# Patient Record
Sex: MALE | Race: WHITE
[De-identification: names, ages, dates, MRNs, and addresses within clinical notes are randomized per-mention and may not be internally consistent; named-entity substitution may affect disease eponyms.]

---

## 2019-09-03 ENCOUNTER — HOSPITAL ENCOUNTER (EMERGENCY)
Dept: HOSPITAL 97 - ER | Age: 34
Discharge: HOME | End: 2019-09-03
Payer: COMMERCIAL

## 2019-09-03 DIAGNOSIS — H01.002: Primary | ICD-10-CM

## 2019-09-03 PROCEDURE — 96372 THER/PROPH/DIAG INJ SC/IM: CPT

## 2019-09-03 PROCEDURE — 99284 EMERGENCY DEPT VISIT MOD MDM: CPT

## 2019-09-03 PROCEDURE — 70450 CT HEAD/BRAIN W/O DYE: CPT

## 2019-09-03 NOTE — RAD REPORT
EXAM DESCRIPTION:  CT - Head Brain Wo Cont - 9/3/2019 9:15 am

 

CLINICAL HISTORY:  Visual disturbance

 

COMPARISON:  None.

 

TECHNIQUE:  Computed axial tomography of the head was obtained. IV contrast was not requested.

 

All CT scans are performed using dose optimization technique as appropriate and may include automated
 exposure control or mA/KV adjustment according to patient size.

 

FINDINGS:  An intracranial  bleed is not seen .

 

The ventricles are normal in caliber.

 

No extra-axial fluid collection is noted.

 

Fluid within the sinuses/ mastoids is not seen.

 

IMPRESSION:  No acute intracranial abnormality is seen. If patient's symptoms persist  MRI of the bra
in would be recommended.

## 2019-09-03 NOTE — ER
Nurse's Notes                                                                                     

 [facility]                                                                 

Name: Nhan Santos                                                                             

Age: 34 yrs                                                                                       

Sex: Male                                                                                         

: 1985                                                                                   

MRN: [id number]                                                                                   

Arrival Date: 2019                                                                          

Time: 08:53                                                                                       

Account#: [id number]                                                                            

Bed 5                                                                                             

Private MD:                                                                                       

Diagnosis: Blepharitis                                                                            

                                                                                                  

Presentation:                                                                                     

                                                                                             

08:58 Presenting complaint: Blurred vision in right eye and under eye "heaviness" x 1 week.   hb  

      Denies pain/injury. Transition of care: patient was not received from another setting       

      of care. Onset of symptoms was 2019. Risk Assessment: Do you want to hurt          

      yourself or someone else? Patient reports no desire to harm self or others. Initial         

      Sepsis Screen: Does the patient meet any 2 criteria? No. Patient's initial sepsis           

      screen is negative. Does the patient have a suspected source of infection? No.              

      Patient's initial sepsis screen is negative. Care prior to arrival: None.                   

08:58 Method Of Arrival: Ambulatory                                                           hb  

08:58 Acuity: FLAVIO 3                                                                           hb  

                                                                                                  

Historical:                                                                                       

- Allergies:                                                                                      

09:00 No Known Allergies;                                                                     hb  

- Home Meds:                                                                                      

09:00 None [Active];                                                                          hb  

- PMHx:                                                                                           

09:00 None;                                                                                   hb  

- PSHx:                                                                                           

09:00 back;                                                                                   hb  

                                                                                                  

- Immunization history:: Adult Immunizations up to date.                                          

- Social history:: Smoking status: Patient/guardian denies using tobacco.                         

- Ebola Screening: : No symptoms or risks identified at this time.                                

                                                                                                  

                                                                                                  

Screenin:41 Abuse screen: Denies threats or abuse. Denies injuries from another. Nutritional        jl7 

      screening: No deficits noted. Tuberculosis screening: No symptoms or risk factors           

      identified. Fall Risk None identified.                                                      

                                                                                                  

Assessment:                                                                                       

09:41 General: Appears in no apparent distress. uncomfortable, Behavior is calm, cooperative, jl7 

      appropriate for age. Pain: Denies pain. Neuro: Level of Consciousness is awake, alert,      

      obeys commands, Oriented to person, place, time, situation. Cardiovascular: Patient's       

      skin is warm and dry. Respiratory: Airway is patent Respiratory effort is even,             

      unlabored, Respiratory pattern is regular, symmetrical. EENT: Sclera/Cornea are             

      reddened in right eye. Derm: Skin is pink, warm \T\ dry.                                    

10:36 Reassessment: Patient appears in no apparent distress at this time. No changes from     jl7 

      previously documented assessment. Patient and/or family updated on plan of care and         

      expected duration. Pain level reassessed. Patient is alert, oriented x 3, equal             

      unlabored respirations, skin warm/dry/pink.                                                 

                                                                                                  

Vital Signs:                                                                                      

09:00  / 93; Pulse 58; Resp 16; Temp 98.1; Pulse Ox 100% on R/A; Weight 92.99 kg;       hb  

      Height 6 ft. (182.88 cm); Pain 0/10;                                                        

10:08  / 91; Pulse 52; Resp 18; Temp 97.7(O); Pulse Ox 100% on R/A;                     kj1 

09:00 Body Mass Index 27.80 (92.99 kg, 182.88 cm)                                             hb  

                                                                                                  

Visual Acuity:                                                                                    

10:00 Left Eye Visual acuity 20/25, Normal; Right Eye Visual acuity 20/13, Contracted; Both   kj1 

      Eyes Visual acuity 20/30; Without Lenses;                                                   

                                                                                                  

ED Course:                                                                                        

08:53 Patient arrived in ED.                                                                  as  

09:00 Triage completed.                                                                       hb  

09:00 Arm band placed on.                                                                     hb  

09:01 Alvaro Hardy NP is PHCP.                                                           pm1 

09:01 Octavio Patel MD is Attending Physician.                                             pm1 

09:14 CT completed. Patient tolerated procedure well. Patient moved to radiology via          sw  

      wheelchair. Patient moved back from CT.                                                     

09:22 CT Head Brain wo Cont In Process Unspecified.                                           EDMS

09:35 Derick Nunez, RN is Primary Nurse.                                                      jl7 

09:41 Patient has correct armband on for positive identification. Bed in low position. Call   [facility] 

      light in reach. Side rails up X 1.                                                          

10:00 Assist provider with eye exam of right eye. using fluorescein stain, Performed by       leroy Hardy NP Patient tolerated well. Patient did not have IV access during this       

      emergency room visit.                                                                       

                                                                                                  

Administered Medications:                                                                         

10:00 Drug: Tetracaine Drops 0.5 % 1 drops {Note: Administered by Alvaro Hardy NP.}       jl7 

      Route: Ophthalmic; Site: right eye;                                                         

10:35 Drug: TORadol 60 mg Route: IM; Site: right ventrogluteal;                               jl7 

10:46 Follow up: Response: No adverse reaction                                                jl7 

                                                                                                  

                                                                                                  

Outcome:                                                                                          

10:23 Discharge ordered by MD.                                                                pm1 

10:46 Discharged to home ambulatory.                                                          jl7 

10:46 Condition: stable                                                                           

10:46 Discharge instructions given to patient, Instructed on discharge instructions, follow       

      up and referral plans. medication usage, Demonstrated understanding of instructions,        

      follow-up care, medications, Prescriptions given X 1.                                       

10:47 Patient left the ED.                                                                    jl7 

                                                                                                  

Signatures:                                                                                       

Dispatcher [employer]                           EDMS                                                 

Snehal Crocker Shannon sw Marinas, Patrick, OTILIO                    NP   pm1                                                  

Airam Mckee, GEETA                     RN                                                      

Derick Nunez RN RN   jl7                                                  

Sandy Ramires                              kj1                                                  

                                                                                                  

Corrections: (The following items were deleted from the chart)                                    

10:02 10:00 Right Eye Without Lenses, 20/25, Contracted, Left Eye Without Lenses, 20/13,      kj1 

      Normal, Both Eyes Without Lenses, 20/30 kj1                                                 

10:36 10:00 Tetracaine Drops 0.5 % 1 drops Ophthalmic in both eyes jl7                        jl7 

                                                                                                  

**************************************************************************************************

## 2019-09-03 NOTE — EDPHYS
Physician Documentation                                                                           

 [facility]                                                                 

Name: Nhan Santos                                                                             

Age: 34 yrs                                                                                       

Sex: Male                                                                                         

: 1985                                                                                   

MRN: [id number]                                                                                   

Arrival Date: 2019                                                                          

Time: 08:53                                                                                       

Account#: [id number]                                                                            

Bed 5                                                                                             

Private MD:                                                                                       

ED Physician Octavio Patel                                                                      

HPI:                                                                                              

                                                                                             

09:07 This 34 yrs old  Male presents to ER via Ambulatory with complaints of Blurred pm1 

      Vision, Numbness - Eye.                                                                     

09:07 The patient is experiencing blurred vision, to the right eye, caused by an unknown      pm1 

      mechanism. Onset: The symptoms/episode began/occurred 1 week(s) ago. Duration: the          

      symptoms are continuous. Aggravated by nothing. Alleviated by closing right eye.            

      Associated signs and symptoms: Pertinent negatives: chills, ear ache, fever, headache,      

      runny nose. Patient does not utilize any form of vision correction. Severity of             

      symptoms: in the emergency department the symptoms are unchanged. The patient has not       

      experienced similar symptoms in the past. The patient has not recently seen a               

      physician. Patient with the sensation of numbness to right lower eyelid and                 

      discoloration. No pain. right eye blurry vision. Denies trauma.                             

                                                                                                  

Historical:                                                                                       

- Allergies:                                                                                      

09:00 No Known Allergies;                                                                     hb  

- Home Meds:                                                                                      

09:00 None [Active];                                                                          hb  

- PMHx:                                                                                           

09:00 None;                                                                                   hb  

- PSHx:                                                                                           

09:00 back;                                                                                   hb  

                                                                                                  

- Immunization history:: Adult Immunizations up to date.                                          

- Social history:: Smoking status: Patient/guardian denies using tobacco.                         

- Ebola Screening: : No symptoms or risks identified at this time.                                

                                                                                                  

                                                                                                  

ROS:                                                                                              

09:07 Constitutional: Negative for fever, chills, and weight loss.                            pm1 

09:07 ENT: Negative for injury, pain, and discharge, Neck: Negative for injury, pain, and         

      swelling, Cardiovascular: Negative for chest pain, palpitations, and edema,                 

      Respiratory: Negative for shortness of breath, cough, wheezing, and pleuritic chest         

      pain, Abdomen/GI: Negative for abdominal pain, nausea, vomiting, diarrhea, and              

      constipation, Back: Negative for injury and pain, MS/Extremity: Negative for injury and     

      deformity, Skin: Negative for injury, rash, and discoloration, Neuro: Negative for          

      headache, weakness, numbness, tingling, and seizure.                                        

09:07 Eyes: Positive for blurry vision, swelling, of the right lower eyelid, Negative for         

      discharge, foreign body sensation.                                                          

                                                                                                  

Exam:                                                                                             

10:00 Constitutional:  This is a well developed, well nourished patient who is awake, alert,  pm1 

      and in no acute distress. Head/Face:  Normocephalic, atraumatic.                            

10:00 ENT:  Nares patent. No nasal discharge, no septal abnormalities noted.  Tympanic            

      membranes are normal and external auditory canals are clear.  Oropharynx with no            

      redness, swelling, or masses, exudates, or evidence of obstruction, uvula midline.          

      Mucous membranes moist. Neck:  Trachea midline, no thyromegaly or masses palpated, and      

      no cervical lymphadenopathy.  Supple, full range of motion without nuchal rigidity, or      

      vertebral point tenderness.  No Meningismus. Chest/axilla:  Normal chest wall               

      appearance and motion.  Nontender with no deformity.  No lesions are appreciated.           

      Cardiovascular:  Regular rate and rhythm with a normal S1 and S2.  No gallops, murmurs,     

      or rubs.  Normal PMI, no JVD.  No pulse deficits. Respiratory:  Lungs have equal breath     

      sounds bilaterally, clear to auscultation and percussion.  No rales, rhonchi or wheezes     

      noted.  No increased work of breathing, no retractions or nasal flaring. Skin:  Warm,       

      dry with normal turgor.  Normal color with no rashes, no lesions, and no evidence of        

      cellulitis. MS/ Extremity:  Pulses equal, no cyanosis.  Neurovascular intact.  Full,        

      normal range of motion.                                                                     

10:00 Eyes: Extraocular movements: no acute changes, Conjunctiva: normal, no chemosis, no         

      exudate, no injection, no subconjunctival hemorrhage no abnormal tearing, Corneas: no       

      acute changes, a fluorescein strip employed to appreciate the findings, Sclera: no          

      acute changes, Lids and lashes: edema, right lower eyelid, Examination of the other eye     

      reveals no obvious gross abnormality.                                                       

10:00 Neuro: Orientation: is normal, Motor: is normal, moves all fours.                           

                                                                                                  

Vital Signs:                                                                                      

09:00  / 93; Pulse 58; Resp 16; Temp 98.1; Pulse Ox 100% on R/A; Weight 92.99 kg;       hb  

      Height 6 ft. (182.88 cm); Pain 0/10;                                                        

10:08  / 91; Pulse 52; Resp 18; Temp 97.7(O); Pulse Ox 100% on R/A;                     kj1 

09:00 Body Mass Index 27.80 (92.99 kg, 182.88 cm)                                             hb  

                                                                                                  

Visual Acuity:                                                                                    

10:00 Left Eye Visual acuity 20/25, Normal; Right Eye Visual acuity 20/13, Contracted; Both   kj1 

      Eyes Visual acuity 20/30; Without Lenses;                                                   

                                                                                                  

MDM:                                                                                              

09:02 Patient medically screened.                                                             pm1 

10:21 Data reviewed: vital signs. Data interpreted: Pulse oximetry: on room air is 100 %.     pm1 

      Interpretation: normal. Counseling: I had a detailed discussion with the patient and/or     

      guardian regarding: the historical points, exam findings, and any diagnostic results        

      supporting the discharge/admit diagnosis, radiology results, the need for outpatient        

      follow up, an opthalmologist, to return to the emergency department if symptoms worsen      

      or persist or if there are any questions or concerns that arise at home.                    

                                                                                                  

                                                                                             

09:07 Order name: CT Head Brain wo Cont; Complete Time: 09:51                                 pm1 

                                                                                             

09:07 Order name: Visual Acuity; Complete Time: 10:13                                         pm1 

                                                                                             

09:07 Order name: Eye Tray; Complete Time: 09:42                                              pm1 

                                                                                             

09:07 Order name: Fluoresene Opth strip; Complete Time: 09:42                                 pm1 

                                                                                                  

Administered Medications:                                                                         

10:00 Drug: Tetracaine Drops 0.5 % 1 drops {Note: Administered by Alvaro Hardy NP.}       jl7 

      Route: Ophthalmic; Site: right eye;                                                         

10:35 Drug: TORadol 60 mg Route: IM; Site: right ventrogluteal;                               jl7 

10:46 Follow up: Response: No adverse reaction                                                jl7 

                                                                                                  

                                                                                                  

Disposition:                                                                                      

15:40 Co-signature as Attending Physician, Octavio Patel MD I agree with the assessment and  oliver 

      plan of care.                                                                               

                                                                                                  

Disposition:                                                                                      

19 10:23 Discharged to Home. Impression: Blepharitis.                                       

- Condition is Stable.                                                                            

- Discharge Instructions: Blepharitis.                                                            

- Prescriptions for Erythromycin 5 mg/gram (0.5 %) Ophthalmic Ointment - apply 1                  

  centimeter by OPHTHALMIC route every 8 hours for 7 days; 1 tube.                                

- Medication Reconciliation Form, Thank You Letter, Antibiotic Education, Prescription            

  Opioid Use form.                                                                                

- Follow up: Emergency Department; When: As needed; Reason: Worsening of condition.               

  Follow up: Private Physician; When: 2 - 3 days; Reason: Recheck today's complaints,             

  Continuance of care, Re-evaluation by your physician.                                           

- Problem is new.                                                                                 

- Symptoms have improved.                                                                         

                                                                                                  

                                                                                                  

                                                                                                  

Signatures:                                                                                       

Dispatcher [employer]                           EDMS                                                 

Octavio Patel MD MD cha Marinas, Patrick, NP                    NP   pm1                                                  

Airam Mckee, RN                     RN                                                      

Derick Nunez RN                        RN   jl7                                                  

                                                                                                  

Corrections: (The following items were deleted from the chart)                                    

10:47 10:23 2019 10:23 Discharged to Home. Impression: Blepharitis. Condition is        jl7 

      Stable. Forms are Medication Reconciliation Form, Thank You Letter, Antibiotic              

      Education, Prescription Opioid Use. Follow up: Emergency Department; When: As needed;       

      Reason: Worsening of condition. Follow up: Private Physician; When: 2 - 3 days; Reason:     

      Recheck today's complaints, Continuance of care, Re-evaluation by your physician.           

      Problem is new. Symptoms have improved. pm1                                                 

                                                                                                  

**************************************************************************************************

## 2019-12-02 ENCOUNTER — HOSPITAL ENCOUNTER (OUTPATIENT)
Dept: HOSPITAL 97 - ER | Age: 34
Setting detail: OBSERVATION
LOS: 1 days | Discharge: HOME | End: 2019-12-03
Attending: HOSPITALIST | Admitting: HOSPITALIST
Payer: COMMERCIAL

## 2019-12-02 VITALS — BODY MASS INDEX: 28.6 KG/M2

## 2019-12-02 DIAGNOSIS — N17.9: ICD-10-CM

## 2019-12-02 DIAGNOSIS — R11.2: ICD-10-CM

## 2019-12-02 DIAGNOSIS — K35.80: Primary | ICD-10-CM

## 2019-12-02 DIAGNOSIS — Z23: ICD-10-CM

## 2019-12-02 LAB
ALBUMIN SERPL BCP-MCNC: 3.8 G/DL (ref 3.4–5)
ALBUMIN SERPL BCP-MCNC: 4.5 G/DL (ref 3.4–5)
ALP SERPL-CCNC: 83 U/L (ref 45–117)
ALP SERPL-CCNC: 97 U/L (ref 45–117)
ALT SERPL W P-5'-P-CCNC: 40 U/L (ref 12–78)
ALT SERPL W P-5'-P-CCNC: 45 U/L (ref 12–78)
AST SERPL W P-5'-P-CCNC: 24 U/L (ref 15–37)
AST SERPL W P-5'-P-CCNC: 31 U/L (ref 15–37)
BLD SMEAR INTERP: (no result)
BUN BLD-MCNC: 14 MG/DL (ref 7–18)
BUN BLD-MCNC: 15 MG/DL (ref 7–18)
GLUCOSE SERPLBLD-MCNC: 306 MG/DL (ref 74–106)
GLUCOSE SERPLBLD-MCNC: 311 MG/DL (ref 74–106)
HCT VFR BLD CALC: 43.5 % (ref 39.6–49)
HCT VFR BLD CALC: 48.2 % (ref 39.6–49)
LIPASE SERPL-CCNC: 532 U/L (ref 73–393)
LYMPHOCYTES # SPEC AUTO: 1 K/UL (ref 0.7–4.9)
LYMPHOCYTES # SPEC AUTO: 1.8 K/UL (ref 0.7–4.9)
MORPHOLOGY BLD-IMP: (no result)
PMV BLD: 10.6 FL (ref 7.6–11.3)
PMV BLD: 9.8 FL (ref 7.6–11.3)
POTASSIUM SERPL-SCNC: 4 MMOL/L (ref 3.5–5.1)
POTASSIUM SERPL-SCNC: 4 MMOL/L (ref 3.5–5.1)
RBC # BLD: 4.9 M/UL (ref 4.33–5.43)
RBC # BLD: 5.5 M/UL (ref 4.33–5.43)
UA COMPLETE W REFLEX CULTURE PNL UR: (no result)

## 2019-12-02 PROCEDURE — 71045 X-RAY EXAM CHEST 1 VIEW: CPT

## 2019-12-02 PROCEDURE — 80053 COMPREHEN METABOLIC PANEL: CPT

## 2019-12-02 PROCEDURE — 80076 HEPATIC FUNCTION PANEL: CPT

## 2019-12-02 PROCEDURE — 99285 EMERGENCY DEPT VISIT HI MDM: CPT

## 2019-12-02 PROCEDURE — 88304 TISSUE EXAM BY PATHOLOGIST: CPT

## 2019-12-02 PROCEDURE — 83690 ASSAY OF LIPASE: CPT

## 2019-12-02 PROCEDURE — 85025 COMPLETE CBC W/AUTO DIFF WBC: CPT

## 2019-12-02 PROCEDURE — 0DTJ4ZZ RESECTION OF APPENDIX, PERCUTANEOUS ENDOSCOPIC APPROACH: ICD-10-PCS

## 2019-12-02 PROCEDURE — 36415 COLL VENOUS BLD VENIPUNCTURE: CPT

## 2019-12-02 PROCEDURE — 44970 LAPAROSCOPY APPENDECTOMY: CPT

## 2019-12-02 PROCEDURE — 96375 TX/PRO/DX INJ NEW DRUG ADDON: CPT

## 2019-12-02 PROCEDURE — 74177 CT ABD & PELVIS W/CONTRAST: CPT

## 2019-12-02 PROCEDURE — 80048 BASIC METABOLIC PNL TOTAL CA: CPT

## 2019-12-02 PROCEDURE — 90471 IMMUNIZATION ADMIN: CPT

## 2019-12-02 PROCEDURE — 81001 URINALYSIS AUTO W/SCOPE: CPT

## 2019-12-02 PROCEDURE — 96374 THER/PROPH/DIAG INJ IV PUSH: CPT

## 2019-12-02 RX ADMIN — MORPHINE SULFATE PRN MG: 2 INJECTION, SOLUTION INTRAMUSCULAR; INTRAVENOUS at 12:29

## 2019-12-02 RX ADMIN — SODIUM CHLORIDE SCH MLS: 0.9 INJECTION, SOLUTION INTRAVENOUS at 06:06

## 2019-12-02 RX ADMIN — SODIUM CHLORIDE, SODIUM LACTATE, POTASSIUM CHLORIDE, AND CALCIUM CHLORIDE ONE MLS: .6; .31; .03; .02 INJECTION, SOLUTION INTRAVENOUS at 14:40

## 2019-12-02 RX ADMIN — CIPROFLOXACIN SCH MLS: 2 INJECTION, SOLUTION INTRAVENOUS at 21:28

## 2019-12-02 RX ADMIN — SERTRALINE HYDROCHLORIDE SCH: 100 TABLET ORAL at 09:00

## 2019-12-02 RX ADMIN — SODIUM CHLORIDE SCH MLS: 0.9 INJECTION, SOLUTION INTRAVENOUS at 12:30

## 2019-12-02 RX ADMIN — CIPROFLOXACIN SCH MLS: 2 INJECTION, SOLUTION INTRAVENOUS at 12:29

## 2019-12-02 RX ADMIN — Medication SCH: at 09:00

## 2019-12-02 RX ADMIN — SODIUM CHLORIDE, SODIUM LACTATE, POTASSIUM CHLORIDE, AND CALCIUM CHLORIDE ONE MLS: .6; .31; .03; .02 INJECTION, SOLUTION INTRAVENOUS at 14:34

## 2019-12-02 RX ADMIN — METRONIDAZOLE SCH MLS: 500 INJECTION, SOLUTION INTRAVENOUS at 16:55

## 2019-12-02 RX ADMIN — Medication SCH ML: at 21:29

## 2019-12-02 RX ADMIN — MORPHINE SULFATE PRN MG: 2 INJECTION, SOLUTION INTRAMUSCULAR; INTRAVENOUS at 17:38

## 2019-12-02 RX ADMIN — BUPIVACAINE HYDROCHLORIDE AND EPINEPHRINE ONE ML: 5; 5 INJECTION, SOLUTION EPIDURAL; INTRACAUDAL; PERINEURAL at 13:52

## 2019-12-02 RX ADMIN — BUPIVACAINE HYDROCHLORIDE AND EPINEPHRINE ONE ML: 5; 5 INJECTION, SOLUTION EPIDURAL; INTRACAUDAL; PERINEURAL at 14:16

## 2019-12-02 NOTE — P.HP
Certification for Inpatient


Patient admitted to: Observation


With expected LOS: <2 Midnights


Patient will require the following post-hospital care: None


Practitioner: I am a practitioner with admitting privileges, knowledge of 

patient current condition, hospital course, and medical plan of care.


Services: Services provided to patient in accordance with Admission 

requirements found in Title 42 Section 412.3 of the Code of Federal Regulations





Patient History


Date of Service: 12/02/19


Reason for admission: Intractable nausea and vomiting/abdominal pain


History of Present Illness: 





Patient is a 35yo who was admitted to the hospital with intractable nausea and 

vomiting. Patient also had abdominal pain. Patient came to the emergency room 

for further evaluation. In the ER, pts work-up revealed elevated lipase with no 

further abnormality.   Patient will be admitted to the hospital for further 

evaluation.  Most likely patient has viral gastroenteritis.    We will get a CT 

scan for further evaluation. We will continue to of evaluate.


Allergies





No Known Allergies Allergy (Verified 12/02/19 05:02)


 





Home Medications: 








ARIPiprazole [Abilify*] 10 mg PO DAILY 12/02/19 


Sertraline [Zoloft*] 100 mg PO DAILY 12/02/19 








- Past Medical/Surgical History


Has patient received pneumonia vaccine in the past: No


Diabetic: No


-: pancreatitis


-: Cervical fusion C4-C7


-: Cholecystectomy





- Family History


  ** Father


Medical History: Heart disease





  ** Mother


Medical History: Hypertension, Cancer


Notes: breast





- Social History


Smoking Status: Never smoker


Alcohol use: No


CD- Drugs: No


Caffeine use: Yes


Place of Residence: Home





Review of Systems


10-point ROS is otherwise unremarkable





Physical Examination





- Vital Signs


Temperature: 98.4 F


Blood Pressure: 108/61


Pulse: 71


Respirations: 17


Pulse Ox (%): 97





- Physical Exam


General: Alert, In no apparent distress, Oriented x3


HEENT: Atraumatic, PERRLA, Mucous membr. moist/pink, EOMI, Sclerae nonicteric


Neck: Supple, 2+ carotid pulse no bruit, No LAD, Without JVD or thyroid 

abnormality


Respiratory: Clear to auscultation bilaterally, Normal air movement


Cardiovascular: Regular rate/rhythm, Normal S1 S2


Gastrointestinal: Normal bowel sounds, Soft and benign, Non-distended, No 

tenderness


Musculoskeletal: No clubbing, No swelling, No tenderness


Integumentary: No rashes


Neurological: Normal gait, Normal speech, Normal strength at 5/5 x4 extr, 

Normal tone, Normal affect


Lymphatics: No axilla or inguinal lymphadenopathy





- Studies


Laboratory Data (last 24 hrs)





12/02/19 01:40: Creatinine 1.43 H


12/02/19 01:40: WBC 15.2 H, Hgb 17.3, Hct 48.2, Plt Count 159


12/02/19 01:40: Sodium 135 L, Potassium 4.0, BUN 15, Creatinine 1.46 H, Glucose 

311 H, Total Bilirubin 0.7, AST 31, ALT 45, Alkaline Phosphatase 97, Lipase 532 

H








Assessment & Plan





- Problems (Diagnosis)


(1) Abdominal pain


Current Visit: Yes   Status: Acute   





(2) Intractable nausea and vomiting


Current Visit: Yes   Status: Acute   





- Plan





  Plan: 


1. IV fluids and IV antibiotics


2. Stool studies


3. GI consultation as an outpatient if symptoms worsen


4. Pain control


5. CT scan for further evaluation


6. GI and DVT prophylaxis 


Discharge Plan: Home


Plan to discharge in: Greater than 2 days





- Advance Directives


Does patient have a Living Will: No


Does patient have a Durable POA for Healthcare: No





- Code Status/Comfort Care


Code Status Assessed: Yes


Code Status: Full Code


Critical Care: No


Time Spent Managing PTS Care (In Minutes): 45

## 2019-12-02 NOTE — RAD REPORT
EXAM DESCRIPTION:  CT - Abdomen   Pelvis W Contrast - 12/2/2019 8:41 am

 

CLINICAL HISTORY:  epigastric pain/pancreatitis vs. eneteritis

 

COMPARISON:  None.

 

TECHNIQUE:  Biphasic, helical CT imaging of the abdomen and pelvis was performed following 100 ml non
-ionic IV contrast. Oral contrast was given.

 

All CT scans are performed using dose optimization technique as appropriate and may include automated
 exposure control or mA/KV adjustment according to patient size.

 

FINDINGS:  No suspicious findings in the lung bases.

 

Liver shows borderline to mild fatty infiltration with no focal liver lesion identified. No splenomeg
neeta or focal splenic finding. Small accessory splenic nodules are present. Gallbladder is not identif
ied. No cholecystectomy clips seen. There is no dilatation of the biliary tree.

 

No solid or cystic mass of the pancreas. No peripancreatic inflammatory stranding.

 

Symmetric renal function is seen with no hydronephrosis or suspicious renal mass. No pyelonephritis o
r acute parenchymal process. No bladder abnormalities. No adrenal abnormalities.

 

No gastric or duodenal wall thickening or edema identified. No stranding in the adjacent fat. Moderat
e stool volume throughout the colon. Fluid filled, nondilated distal small bowel loops are seen. Sigm
oid colon is tortuous and redundant.

 

Patient has an elongated appendix. No appendicolith seen. The distal appendix is 8- 9 mm in diameter.
 No periappendiceal inflammatory stranding.

 

  No free air, free fluid or pneumatosis.  No mass or bulky lymphadenopathy. Patient has a very small
 umbilical hernia.

 

No suspicious bony findings.

 

 

IMPRESSION:  No pancreatitis findings.  Stomach and duodenum show no acute findings.

 

Patient has any elongated appendix with the tip 8-9 mm in size. No appendicolith or periappendiceal i
nflammatory stranding.

 

Appendicitis is unlikely given the history of epigastric pain. Correlation can be made with laborator
y and exam findings. CT re-evaluation can be performed if the symptoms localize to the right lower qu
adrant.

## 2019-12-02 NOTE — CON
Date of Consultation:  12/02/2019



Brief History Of Present Illness:  Patient is a 34-year-old  male with a history of intracta
ble nausea, vomiting, and epigastric abdominal pain with radiation to the right lower quadrant.  He c
susana to the emergency room with the above-stated complaints.  Pain got progressively worse over the co
urse of the day and worse with movement.  It is radiating through his back as well on the back, flank
 area on the right.  He was admitted with possible enteritis type picture.



Past Medical History:  Significant for pancreatitis.



Past Surgical History:  Cervical fusion, cholecystectomy.



Home Medications:  Include Abilify and Zoloft for anxiety, depression.



Allergies:  NO KNOWN DRUG ALLERGIES.



Family History:  Reviewed, noncontributory.



Social History:  He denies smoking, alcohol, or recreational drug use.  He was a 
 by [employer].



Physical Examination:

Vital Signs:  At time of my examination, his BMI is 20.7, blood pressure 103/60, pulse 74, respirator
y rate 18, temperature 98.5. 

General:  He is awake, alert, and oriented. 

Psychiatric:  Appropriate and conversive. 

HEENT:  Normocephalic.  Sclerae icteric.  Mucous membranes are moist. 

Oropharynx:  Clear. 

Neck:  Supple.  No JVD.  

Chest:  Normal expansion and excursion. 

Cardiovascular:  Rate and rhythm. 

Pulmonary:  Clear to auscultation bilaterally. 

Abdomen:  Soft with positive epigastric and right upper quadrant tenderness to palpation.  He has pos
itive focal peritonitis at [place] point.  The remainder of his abdominal exam is consistent with a 
right lower quadrant abdominal pain with minimal tenderness with remainder of examination only to omkar
p palpation. 

Extremities:  No clubbing, cyanosis, edema. 

Skin:  Warm and dry.



Laboratory Data:  Reveals a white blood count 13.8, hemoglobin 15.1, hematocrit of 42.5, platelet cou
nt is 129, neutrophils 86%.  His sodium 136, potassium 4.0, chloride 104, carbon dioxide 28, BUN 14, 
creatinine 1.2, glucose is 306, total bilirubin 0.7, direct bilirubin 0.1, AST 24, ALT 40, alkaline p
hosphatase 83, lipase is 216.  His UA showed 3+ blood, 2+ glucose, 1+ ketones.  He had imaging perfor
med which included a CT scan of the abdomen and pelvis, officially read as no pancreatitis findings. 
 Stomach and duodenum show no acute findings.  Patient has an elongated appendix with the tip 8-9 mm 
in size.  No appendicolith or periappendiceal fat stranding or inflammatory stranding.  Appendicitis 
is unlikely given the history of epigastric pain.  Correlation needed with laboratory exam and findin
gs.  CT re-evaluation of symptoms localized to the right lower quadrant.



Assessment And Plan:  This is a 34-year-old male with localizing symptoms to right lower quadrant and
 findings of an equivocal possible early appendicitis.

1.IV fluid hydration.

2.Antibiotic coverage.

3.I have explained risks, benefits, and alternatives of diagnostic laparoscopy and appendectomy incl
uding but not limited to bleeding, infection, damage to surrounding tissue need for further operation
 and procedures.  Patient agrees to proceed as indicated.





DEMETRIS/YUE

DD:  12/02/2019 13:29:27Voice ID:  [id number]

DT:  12/02/2019 17:17:55Report ID:  [id number]

## 2019-12-02 NOTE — RAD REPORT
EXAM DESCRIPTION:  RAD - Chest Single View - 12/2/2019 7:10 am

 

CLINICAL HISTORY:  Pneumonia

 

COMPARISON:  None.

 

TECHNIQUE:  AP portable chest image was obtained 0655 hours .

 

FINDINGS:  Lungs are clear. Heart and vasculature are normal. No measurable pleural effusion and no p
neumothorax. No acute bony abnormality seen. No acute aortic findings suspected.

 

IMPRESSION:  No acute cardiopulmonary process.

## 2019-12-02 NOTE — P.OP
Preoperative diagnosis: Acute Appendicitis


Postoperative diagnosis: Acute Appendicitis


Primary procedure: Diagnostic Laparoscopy


Secondary procedure: Laparoscopic Appendectomy


Anesthesia: GETA + Local


Estimated blood loss: < 5 cc


Specimen: Vermiform Appendix


Findings: Early appendicitis


Complications: None


Transferred to: Recovery Room


Condition: Good

## 2019-12-02 NOTE — EDPHYS
Physician Documentation                                                                           

 [facility]                                                                 

Name: Nhan Santos                                                                             

Age: 34 yrs                                                                                       

Sex: Male                                                                                         

: 1985                                                                                   

MRN: [id number]                                                                                   

Arrival Date: 2019                                                                          

Time: 01:19                                                                                       

Account#: [id number]                                                                            

Bed 2                                                                                             

Private MD:                                                                                       

ED Physician Solomon Mitchell                                                                     

HPI:                                                                                              

                                                                                             

01:43 This 34 yrs old  Male presents to ER via Ambulatory with complaints of         tw4 

      Abdominal Pain, Nausea.                                                                     

01:43 The patient presents to the emergency department with nausea, that is moderate,         tw4 

      vomiting. Onset: The symptoms/episode began/occurred today. Possible causes: unknown.       

01:43 Onset: The symptoms/episode began/occurred 1 hour(s) ago. The symptoms are aggravated   tw4 

      by nothing. The symptoms are alleviated by nothing. Associated signs and symptoms: The      

      patient has no apparent associated signs or symptoms. Severity of symptoms: At their        

      worst the symptoms were moderate in the emergency department the symptoms are               

      unchanged. The patient has not experienced similar symptoms in the past.                    

                                                                                                  

Historical:                                                                                       

- Allergies:                                                                                      

01:40 No Known Allergies;                                                                     rr5 

- PMHx:                                                                                           

01:40 Pancreatitis;                                                                           rr5 

- PSHx:                                                                                           

01:40 2 cervical fusion; Cholecystectomy;                                                     rr5 

                                                                                                  

- Immunization history:: Adult Immunizations up to date.                                          

- Social history:: Smoking status: Patient/guardian denies using tobacco, Patient uses            

  alcohol, occasionally. Patient/guardian denies using street drugs.                              

- Ebola Screening: : Patient negative for fever greater than or equal to 101.5 degrees            

  Fahrenheit, and additional compatible Ebola Virus Disease symptoms Patient denies               

  exposure to infectious person Patient denies travel to an Ebola-affected area in the            

  21 days before illness onset.                                                                   

                                                                                                  

                                                                                                  

ROS:                                                                                              

01:43 Constitutional: Negative for fever, chills, and weight loss, Eyes: Negative for injury, tw4 

      pain, redness, and discharge, Cardiovascular: Negative for chest pain, palpitations,        

      and edema, Respiratory: Negative for shortness of breath, cough, wheezing, and              

      pleuritic chest pain, Back: Negative for injury and pain, MS/Extremity: Negative for        

      injury and deformity, Skin: Negative for injury, rash, and discoloration.                   

01:43 Abdomen/GI: Positive for abdominal pain, Negative for nausea and vomiting, nausea,          

      vomiting, and diarrhea, nausea, vomiting, abdominal cramps, abdominal distension,           

      anorexia, dysphagia, hematemesis, black/tarry stool, rectal pain, rectal bleeding,          

      bowel incontinence.                                                                         

                                                                                                  

Exam:                                                                                             

01:43 Constitutional:  This is a well developed, well nourished patient who is awake, alert,  tw4 

      and in no acute distress. Head/Face:  Normocephalic, atraumatic. Chest/axilla:  Normal      

      chest wall appearance and motion.  Nontender with no deformity.  No lesions are             

      appreciated. Cardiovascular:  Regular rate and rhythm with a normal S1 and S2.  No          

      gallops, murmurs, or rubs.  Normal PMI, no JVD.  No pulse deficits. Respiratory:  Lungs     

      have equal breath sounds bilaterally, clear to auscultation and percussion.  No rales,      

      rhonchi or wheezes noted.  No increased work of breathing, no retractions or nasal          

      flaring. Back:  No spinal tenderness.  No costovertebral tenderness.  Full range of         

      motion. Skin:  Warm, dry with normal turgor.  Normal color with no rashes, no lesions,      

      and no evidence of cellulitis. MS/ Extremity:  Pulses equal, no cyanosis.                   

      Neurovascular intact.  Full, normal range of motion. Neuro:  Awake and alert, GCS 15,       

      oriented to person, place, time, and situation.  Cranial nerves II-XII grossly intact.      

      Motor strength 5/5 in all extremities.  Sensory grossly intact.  Cerebellar exam            

      normal.  Normal gait.                                                                       

01:43 Abdomen/GI: Inspection: abdomen appears normal, Bowel sounds: diminished, Palpation:        

      moderate abdominal tenderness, in the epigastric area.                                      

                                                                                                  

Vital Signs:                                                                                      

01:38  / 89; Pulse 73; Resp 20; Temp 98.3; Pulse Ox 100% ; Weight 95.25 kg; Height 6    rr5 

      ft. 0 in. (182.88 cm); Pain 7/10;                                                           

02:30  / 84; Pulse 76; Resp 18; Pulse Ox 95% on R/A;                                    ea  

03:35  / 74; Pulse 70; Resp 17; Temp 98; Pulse Ox 99% ; Pain 5/10;                      rr5 

04:15  / 76; Pulse 70; Resp 18; Pulse Ox 99% on R/A;                                    ea  

01:38 Body Mass Index 28.48 (95.25 kg, 182.88 cm)                                             rr5 

                                                                                                  

MDM:                                                                                              

01:37 Patient medically screened.                                                             tw4 

02:35 Differential diagnosis: Nonspecific abd pain, gastritis, cholecystitis, pancreatitis,   tw4 

      appendicitis, diverticulitis. Data reviewed: vital signs, nurses notes. Data reviewed:      

      lab test result(s), CBC, white blood cell count, hemoglobin, hematocrit, platelets,         

      electrolytes, sodium, potassium, chloride, serum bicarbonate, BUN, creatinine, serum        

      glucose, hepatic panel. Data interpreted: Pulse oximetry: Interpretation: normal.           

      Counseling: I had a detailed discussion with the patient and/or guardian regarding: the     

      historical points, exam findings, and any diagnostic results supporting the                 

      discharge/admit diagnosis, lab results. Medication response: Phenergan partially            

      relieved the patient's nausea. Response to treatment: the patient's symptoms have           

      mildly improved after treatment, and as a result, I will admit patient. Physician           

      consultation: Sarthak Galicia MD regarding admission, to the medical/surgical unit.           

      patient's condition, and will see patient in inpatient room.                                

                                                                                                  

                                                                                             

01:37 Order name: Basic Metabolic Panel; Complete Time: 02:28                                 tw4 

                                                                                             

02:28 Interpretation: ; GFR 55; CRE 1.46; GLUC 311.                                     tw4 

                                                                                             

01:37 Order name: CBC with Diff; Complete Time: 02:28                                         tw4 

                                                                                             

02:29 Interpretation: Normal except: WBC 15.2; RBC 5.50; EHSAN% 82.6; LYM% 11.9; NEUT A 12.6.   tw4 

                                                                                             

01:37 Order name: Creatinine for Radiology; Complete Time: 02:28                              tw4 

                                                                                             

02:29 Interpretation: Normal except: CRE 1.43; GFR 56.                                        tw4 

                                                                                             

01:37 Order name: Hepatic Function; Complete Time: 02:28                                      tw4 

                                                                                             

02:29 Interpretation: Normal except: TP 8.6; GLOB 4.1.                                        tw4 

                                                                                             

01:37 Order name: Lipase; Complete Time: 02:28                                                tw4 

                                                                                             

02:29 Interpretation: Normal except: .                                                 tw4 

                                                                                             

03:32 Order name: CBC with Automated Diff                                                     EDMS

                                                                                             

03:32 Order name: Comprehensive Metabolic Panel                                               EDMS

                                                                                             

03:32 Order name: Comprehensive Metabolic Panel                                               EDMS

                                                                                             

03:35 Order name: CBC with Automated Diff                                                     EDMS

                                                                                             

03:35 Order name: Urinalysis W/Microscopic                                                    EDMS

                                                                                             

03:35 Order name: Chest Single View                                                           EDMS

                                                                                             

03:35 Order name: Lipase                                                                      EDMS

                                                                                             

01:37 Order name: IV Saline Lock; Complete Time: 01:38                                        tw4 

                                                                                             

01:37 Order name: Labs collected and sent; Complete Time: 01:38                               tw4 

                                                                                             

03:32 Order name: CONS Pharmacy Consult                                                       EDMS

                                                                                             

03:32 Order name: Regular                                                                     EDMS

                                                                                                  

Administered Medications:                                                                         

01:50 Drug: morphine 4 mg {Note: RASS 1.} Route: IVP; Site: right antecubital;                ea  

02:59 Follow up: Response: No adverse reaction; Pain is decreased; RASS: Alert and Calm (0)   ea  

01:50 Drug: Zofran 4 mg Route: IVP; Site: right antecubital;                                  ea  

02:59 Follow up: Response: No adverse reaction                                                ea  

02:14 Drug: Phenergan 25 mg Route: IVP; Site: right antecubital;                              ea  

02:59 Follow up: Response: No adverse reaction; Marked relief of symptoms                     ea  

                                                                                                  

                                                                                                  

Disposition:                                                                                      

19 02:35 Hospitalization ordered by Sarthak Galicia for Inpatient Admission. Preliminary     

  diagnosis are Cyclical vomiting, intractable, Acute pancreatitis,                               

  unspecified, Acute kidney failure.                                                              

- Bed requested for Telemetry/MedSurg (Inpatient).                                                

- Status is Inpatient Admission.                                                              rr5 

- Condition is Fair.                                                                              

- Problem is new.                                                                                 

- Symptoms are unchanged.                                                                         

UTI on Admission? No                                                                              

                                                                                                  

                                                                                                  

                                                                                                  

Signatures:                                                                                       

Dispatcher [employer]                           [facility]                                                 

Marisa James RN RN ea Aguilar, Jose RN                       GEET[employer]   ja1                                                  

Solomon Mitchell MD MD   tw4                                                  

Reynaldo Tafoya RN                      RN   rr5                                                  

                                                                                                  

Corrections: (The following items were deleted from the chart)                                    

03:41 02:35 Hospitalization Ordered by Sarthak Galicia MD for Inpatient Admission. Preliminary  ja1 

      diagnosis is Cyclical vomiting, intractable; Acute pancreatitis, unspecified; Acute         

      kidney failure. Bed requested for Telemetry/MedSurg (Inpatient). Status is Inpatient        

      Admission. Condition is Fair. Problem is new. Symptoms are unchanged. UTI on Admission?     

      No. tw4                                                                                     

04:24 03:41 2019 02:35 Hospitalization Ordered by Sarthak Galicia MD for Inpatient        rr5 

      Admission. Preliminary diagnosis is Cyclical vomiting, intractable; Acute pancreatitis,     

      unspecified; Acute kidney failure. Bed requested for Telemetry/MedSurg (Inpatient).         

      Status is Inpatient Admission. Condition is Fair. Problem is new. Symptoms are              

      unchanged. UTI on Admission? No. ja1                                                        

                                                                                                  

**************************************************************************************************

## 2019-12-02 NOTE — ER
Nurse's Notes                                                                                     

 [facility]                                                                 

Name: Nhan Santos                                                                             

Age: 34 yrs                                                                                       

Sex: Male                                                                                         

: 1985                                                                                   

MRN: [id number]                                                                                   

Arrival Date: 2019                                                                          

Time: 01:19                                                                                       

Account#: [id number]                                                                            

Bed 2                                                                                             

Private MD:                                                                                       

Diagnosis: Cyclical vomiting, intractable;Acute pancreatitis, unspecified;Acute kidney failure    

                                                                                                  

Presentation:                                                                                     

                                                                                             

01:35 Presenting complaint: Patient states: sudden abdominal pain radiates to right mid back  rr5 

      started 1 1/2 hour ago, feels nauseous denies vomiting. Transition of care: patient was     

      not received from another setting of care. Onset of symptoms was 2019 at       

      00:00. Risk Assessment: Do you want to hurt yourself or someone else? Patient reports       

      no desire to harm self or others. Initial Sepsis Screen: Does the patient meet any 2        

      criteria? No. Patient's initial sepsis screen is negative. Does the patient have a          

      suspected source of infection? No. Patient's initial sepsis screen is negative. Care        

      prior to arrival: None.                                                                     

01:35 Method Of Arrival: Ambulatory                                                           rr5 

01:35 Acuity: FLAVIO 3                                                                           rr5 

                                                                                                  

Historical:                                                                                       

- Allergies:                                                                                      

01:40 No Known Allergies;                                                                     rr5 

- PMHx:                                                                                           

01:40 Pancreatitis;                                                                           rr5 

- PSHx:                                                                                           

01:40 2 cervical fusion; Cholecystectomy;                                                     rr5 

                                                                                                  

- Immunization history:: Adult Immunizations up to date.                                          

- Social history:: Smoking status: Patient/guardian denies using tobacco, Patient uses            

  alcohol, occasionally. Patient/guardian denies using street drugs.                              

- Ebola Screening: : Patient negative for fever greater than or equal to 101.5 degrees            

  Fahrenheit, and additional compatible Ebola Virus Disease symptoms Patient denies               

  exposure to infectious person Patient denies travel to an Ebola-affected area in the            

  21 days before illness onset.                                                                   

                                                                                                  

                                                                                                  

Screenin:43 Abuse screen: Denies threats or abuse. Denies injuries from another. Nutritional        rr5 

      screening: No deficits noted. Tuberculosis screening: No symptoms or risk factors           

      identified. Fall Risk IV access (20 points). Total Ramírez Fall Scale indicates No Risk       

      (0-24 pts).                                                                                 

                                                                                                  

Assessment:                                                                                       

01:41 General: Appears in no apparent distress. uncomfortable, Behavior is calm, cooperative, rr5 

      appropriate for age. Pain: Complains of pain in right and left upper quadrant Pain          

      radiates to right mid back Pain currently is 7 out of 10 on a pain scale. Quality of        

      pain is described as aching, Pain began suddenly, Is continuous. Neuro: Level of            

      Consciousness is awake, alert, obeys commands, Oriented to person, place, time,             

      situation, Appropriate for age. Cardiovascular: Capillary refill < 3 seconds Patient's      

      skin is warm and dry. Respiratory: Airway is patent Respiratory effort is even,             

      unlabored, Respiratory pattern is regular, symmetrical. GI: Abdomen is round Bowel          

      sounds present X 4 quads. Abdomen is tender to palpation in upper quadrants Guarding        

      noted in upper quadrants Reports upper abdominal pain, nausea, Patient currently denies     

      vomiting. : No signs and/or symptoms were reported regarding the genitourinary            

      system. EENT: No signs and/or symptoms were reported regarding the EENT system. Derm:       

      Skin is intact, is healthy with good turgor, Skin temperature is warm. Musculoskeletal:     

      Circulation, motion, and sensation intact. Capillary refill < 3 seconds.                    

02:14 Reassessment: Patient and/or family updated on plan of care and expected duration. Pain ea  

      level reassessed. Pt reported still feeling nauseous, provider notified, medication         

      order obtained, medication administered. Pt tolerated well.                                 

03:58 Reassessment: Patient and/or family updated on plan of care and expected duration. Pain ea  

      level reassessed. Patient is alert, oriented x 3, equal unlabored respirations, skin        

      warm/dry/pink. Reports nausea has decreased. Report called to Christo CONNOR.                   

                                                                                                  

Vital Signs:                                                                                      

01:38  / 89; Pulse 73; Resp 20; Temp 98.3; Pulse Ox 100% ; Weight 95.25 kg; Height 6    rr5 

      ft. 0 in. (182.88 cm); Pain 7/10;                                                           

02:30  / 84; Pulse 76; Resp 18; Pulse Ox 95% on R/A;                                    ea  

03:35  / 74; Pulse 70; Resp 17; Temp 98; Pulse Ox 99% ; Pain 5/10;                      rr5 

04:15  / 76; Pulse 70; Resp 18; Pulse Ox 99% on R/A;                                    ea  

01:38 Body Mass Index 28.48 (95.25 kg, 182.88 cm)                                             rr5 

                                                                                                  

ED Course:                                                                                        

01:19 Patient arrived in ED.                                                                  ds1 

01:34 Reynaldo Tafoya, RN is Primary Nurse.                                                    rr5 

01:36 Twining, Solomon, MD is Attending Physician.                                            tw4 

01:38 Triage completed.                                                                       rr5 

01:40 Arm band placed on.                                                                     rr5 

01:44 Patient has correct armband on for positive identification. Placed in gown. Bed in low  rr5 

      position. Call light in reach. Side rails up X2. Pulse ox on. NIBP on.                      

01:55 Inserted saline lock: 20 gauge in right antecubital area, using aseptic technique.      rr5 

      ,using aseptic technique. inserted by matthias CONNOR Blood collected.                             

02:35 Sarthak Galicia MD is Hospitalizing Provider.                                           tw4 

03:48 No provider procedures requiring assistance completed. Patient admitted, IV remains in  ea  

      place.                                                                                      

                                                                                                  

Administered Medications:                                                                         

01:50 Drug: morphine 4 mg {Note: RASS 1.} Route: IVP; Site: right antecubital;                ea  

02:59 Follow up: Response: No adverse reaction; Pain is decreased; RASS: Alert and Calm (0)   ea  

01:50 Drug: Zofran 4 mg Route: IVP; Site: right antecubital;                                  ea  

02:59 Follow up: Response: No adverse reaction                                                ea  

02:14 Drug: Phenergan 25 mg Route: IVP; Site: right antecubital;                              ea  

02:59 Follow up: Response: No adverse reaction; Marked relief of symptoms                     ea  

                                                                                                  

                                                                                                  

Outcome:                                                                                          

02:35 Decision to Hospitalize by Provider.                                                    tw4 

03:48 Instructed on the need for admit.                                                       ea  

04:10 Admitted to Med/surg accompanied by tech, via wheelchair, room 228, with chart, Report  rr5 

      called to  christo                                                                          

04:10 Condition: stable                                                                           

04:24 Patient left the ED.                                                                    rr5 

                                                                                                  

Signatures:                                                                                       

Radha Chung                                ds1                                                  

Marisa James, RN                      RN   ea                                                   

Solomon Mitchell MD MD   tw4                                                  

Reynaldo Tafoya RN                      RN   rr5                                                  

                                                                                                  

**************************************************************************************************

## 2019-12-02 NOTE — PN
Date of Progress Note:  12/02/2019



Subjective:  Patient seen and examined.  Chart reviewed and case discussed with 
RN and Dr. May.  Patient complaining of some nausea as well as abdominal 
pain.  Has no appetite.



Medication List:  Reviewed.



Physical Examination:

Vital Signs:  Temperature 98.4, heart rate 71, blood pressure 108/61, 
respirations 17, O2 97% on room air. 

General:  Awake, alert, and oriented x3, in some mild distress. 

CV:  S1, S2.  Regular rate and rhythm.  Peripheral pulses present. 

Respiratory:  Moving air well bilaterally.  No wheezing or stridor.  No use of 
accessory muscles. 

Gastrointestinal:  Abdomen is soft.  Mild tenderness to palpation in the 
epigastric region as well as right lower quadrant.  No rebound or guarding.  
Positive bowel sounds. 

Extremities:  No clubbing, cyanosis, or edema. 

Neurologic:  Nonfocal.



Laboratory Data:  Sodium 136, potassium 4, chloride 104, CO2 of 28, BUN 14, 
creatinine 1.26, glucose 306, calcium 9, lipase 216.  WBC 13.2, H and H 15.1 
and 43.5, platelets 129, neutrophils 86%.  Chest x-ray shows no acute 
cardiopulmonary process.  CT scan of the abdomen and pelvis shows no pancreatic 
findings.  Stomach and duodenum showed no acute findings.  Patient has 
elongated appendix with 8-9 mm in size.  No appendical or periappendiceal 
inflammatory stranding.  Appendicitis unlikely given history of epigastric pain.



Assessment And Plan:  A 34-year-old male with:

1.   Acute generalized abdominal pain radiating to the right lower quadrant.  
CT scan shows enlarged appendix, however, no inflammatory signs of 
appendicitis.  However, clinically, patient has pain, decreased p.o. intake, 
nausea and vomiting.  Patient to be started on IV antibiotics and we will 
consult with Surgery.  I spoke with Dr. May, who recommends to keep pt 
n.p.o. for now.

2.   Intractable nausea and vomiting.  Continue with antiemetics.

3.   Overweight.  BMI 28.7.

4.   Elevated lipase level.  No clinical signs of pancreatitis on CT scan.  
Lipase is normalized.

5.   Acute kidney injury, resolved.  Creatinine is back down to normal.  GFR 
still low.  I will continue with IV fluids.  Avoid NSAIDs.



Plan:  Keep n.p.o.  Surgery evaluation for possible appendicitis.  Add 
antibiotics.  Continue with pain control, IV analgesia.  SCDs for DVT 
prophylaxis.





SA/MODL

DD:  12/02/2019 12:08:20   Voice ID:  [id number]

DT:  12/02/2019 16:16:41   Report ID:  [id number]

[email or web address]

## 2019-12-03 VITALS — SYSTOLIC BLOOD PRESSURE: 100 MMHG | DIASTOLIC BLOOD PRESSURE: 58 MMHG

## 2019-12-03 VITALS — TEMPERATURE: 98.4 F

## 2019-12-03 VITALS — OXYGEN SATURATION: 96 %

## 2019-12-03 LAB
BUN BLD-MCNC: 13 MG/DL (ref 7–18)
GLUCOSE SERPLBLD-MCNC: 228 MG/DL (ref 74–106)
HCT VFR BLD CALC: 39.2 % (ref 39.6–49)
LYMPHOCYTES # SPEC AUTO: 1.1 K/UL (ref 0.7–4.9)
PMV BLD: 10.4 FL (ref 7.6–11.3)
POTASSIUM SERPL-SCNC: 3.8 MMOL/L (ref 3.5–5.1)
RBC # BLD: 4.41 M/UL (ref 4.33–5.43)

## 2019-12-03 RX ADMIN — SERTRALINE HYDROCHLORIDE SCH MG: 100 TABLET ORAL at 08:54

## 2019-12-03 RX ADMIN — METRONIDAZOLE SCH MLS: 500 INJECTION, SOLUTION INTRAVENOUS at 08:54

## 2019-12-03 RX ADMIN — Medication SCH ML: at 08:55

## 2019-12-03 RX ADMIN — METRONIDAZOLE SCH MLS: 500 INJECTION, SOLUTION INTRAVENOUS at 00:17

## 2019-12-03 RX ADMIN — CIPROFLOXACIN SCH MLS: 2 INJECTION, SOLUTION INTRAVENOUS at 08:54

## 2019-12-03 NOTE — P.DS
Admission Date: 12/02/19


Discharge Date: 12/03/19


Disposition: ROUTINE DISCHARGE


Discharge Condition: GOOD


Reason for Admission: Intractable nausea and vomiting/abdominal pain


Consultations: 


General surgery-Dr. May





Procedures: 


Laparoscopic appendectomy








- Problems


(1) Abdominal pain


Status: Acute   





(2) Acute appendicitis


Status: Acute   





(3) Intractable nausea and vomiting


Status: Acute   


Brief History of Present Illness: 


34-year-old gentleman with a history of anxiety disorder presented to the ED 

with a complaint of sudden onset abdominal pain intractable nausea and 

vomiting.  CT abdomen and pelvis done in the ED was unremarkable.  His lipase 

level was elevated.  Serum creatinine was also mildly elevated, baseline 

unknown.  Patient was admitted for further management.





Hospital Course: 


Patient was placed on supportive measures with IV fluids, IV antiemetics and 

kept NPO.  He was seen and evaluated by general surgery Dr. Castillo who 

suspected early appendicitis.  Dr. May then performed laparoscopic 

appendectomy, postop diagnosis was appendicitis.  Patient's symptoms got 

resolved and was asymptomatic after the procedure except some soreness from the 

laparoscopic wounds.  His lipase level also normalized.  Patient seen and 

evaluated by Dr. May today and deemed clinically stable for discharge.  He 

will follow with Dr. May within 2 weeks.





Vital Signs/Physical Exam: 














Temp Pulse Resp BP Pulse Ox


 


 98.4 F   75   16   100/58 L  95 


 


 12/03/19 08:00  12/03/19 08:00  12/03/19 08:00  12/03/19 08:00  12/03/19 08:00








General: Alert, In no apparent distress, Oriented x3


HEENT: Atraumatic, Normocephalic


Neck: Supple


Respiratory: Clear to auscultation bilaterally, Normal air movement


Cardiovascular: No edema, Regular rate/rhythm, Normal S1 S2


Gastrointestinal: Normal bowel sounds, Soft and benign, Non-distended


Musculoskeletal: No swelling


Integumentary: No rashes


Neurological: Normal speech, Normal strength at 5/5 x4 extr


Laboratory Data at Discharge: 














WBC  9.3 K/uL (4.3-10.9)  D 12/03/19  05:20    


 


Hgb  13.9 g/dL (13.6-17.9)   12/03/19  05:20    


 


Hct  39.2 % (39.6-49.0)  L  12/03/19  05:20    


 


Plt Count  128 K/uL (152-406)  L  12/03/19  05:20    


 


Sodium  140 mmol/L (136-145)   12/03/19  05:20    


 


Potassium  3.8 mmol/L (3.5-5.1)   12/03/19  05:20    


 


BUN  13 mg/dL (7-18)   12/03/19  05:20    


 


Creatinine  1.10 mg/dL (0.55-1.3)   12/03/19  05:20    


 


Glucose  228 mg/dL ()  H  12/03/19  05:20    


 


Total Bilirubin  0.7 mg/dL (0.2-1.0)   12/02/19  04:14    


 


AST  24 U/L (15-37)   12/02/19  04:14    


 


ALT  40 U/L (12-78)   12/02/19  04:14    


 


Alkaline Phosphatase  83 U/L ()   12/02/19  04:14    


 


Lipase  216 U/L ()   12/02/19  04:14    








Home Medications: 








ARIPiprazole [Abilify*] 10 mg PO DAILY 12/02/19 


Sertraline [Zoloft*] 100 mg PO DAILY 12/02/19 





Diet: Regular


Activity: Ad belen


Followup: 


Garrison May MD [ACTIVE - CAN ADMIT] - 1-2 Weeks (Please call to make an 

appointment. )

## 2019-12-03 NOTE — OP
Date of Procedure:  12/02/2019



Surgeon:  Garrison May MD, MD



Preoperative Diagnosis:  Acute appendicitis.



Postoperative Diagnosis:  Acute appendicitis.



Procedures Performed:  

1.Diagnostic laparoscopy.

2.Laparoscopic appendectomy.



Anesthesia:  General endotracheal plus local with 0.5% Marcaine with epinephrine.



Estimated Fluid Loss:  Less than 5 cc.



Specimen:  Vermiform appendix.



Findings:  Early appendicitis.



Complications:  None.



Disposition:  Transferred to recovery room in good condition.



Procedure In Detail:  After informed consent was obtained, patient was brought to the operating room,
 prepped and draped in the usual sterile fashion.  After adequate anesthesia was achieved, an infraum
bilical area was anesthetized with 0.5% Marcaine, sharply incised, and a 5 mm trocar was introduced i
n the abdomen without incident or complication.  Insufflation was obtained to 15 mmHg at this time.  
The area was inspected.  There was no injury to vital structures upon entry into the abdomen.  Two ad
ditional trocars were placed, 1 in the left lower quadrant, 1 in the right lower quadrant.  These wer
e both similarly anesthetized, sharply incised, and 5 mm trocars were introduced into the abdomen wit
hout incident or complication.  The umbilical trocar was then upsized to 12 mm under direct visualiza
tion without incident or complication.  Patient was positioned in head-down right side up position.  
Ratcheted grasper was used to grasp patient's appendix found to be in the right lower quadrant and co
nsistent with early mild appendicitis.  A mesoappendiceal window was created with the [place] retrac
tor.  Endo SHIVA 35 blue load was fired across the base of the appendix with good approximation of tiss
ues.  The LigaSure device was then used to take the mesoappendix down without incident or complicatio
n.  The appendix was then placed in an EndoCatch bag and removed through the umbilical trocar, sent o
ff for pathologic examination.  The area was copiously irrigated multiple times until completely keyla
r.  After reinsufflation was obtained, there was no injury to vital structures.  The stomach was then
 inspected as well as the right upper quadrant, left lower quadrant grossly without significant manip
ulation.  I panned around and looked the remaining abdomen.  I did not see any other pathologic findi
ngs.  The area was then irrigated one last time and suctioned out in the right lower quadrant.  Patie
nt was then placed back in neutral position.  Additional dissection was performed at this time.  The 
umbilical trocar was then removed.  The umbilical trocar site was then closed using a Brent-Gisell
 suture passer and 0 Vicryl with an interrupted fashion with good approximation of tissues.  The abdo
men was then completely desufflated under direct visualization without incident or complication.  All
 trocars were removed.  All skin incisions were copiously irrigated and closed with a 4-0 Monocryl in
 a running fashion.  Dermabond was placed over top.  Patient tolerated the procedure without incident
 or complication, transferred to the PACU in good condition.  All counts were correct at the end of t
he case.





DEMETRIS/YUE

DD:  12/02/2019 14:51:29Voice ID:  [id number]

DT:  12/03/2019 01:40:34Report ID:  [id number]

## 2020-03-14 ENCOUNTER — HOSPITAL ENCOUNTER (EMERGENCY)
Dept: HOSPITAL 97 - ER | Age: 35
Discharge: HOME | End: 2020-03-14
Payer: COMMERCIAL

## 2020-03-14 DIAGNOSIS — M54.42: Primary | ICD-10-CM

## 2020-03-14 LAB — UA COMPLETE W REFLEX CULTURE PNL UR: (no result)

## 2020-03-14 PROCEDURE — 81003 URINALYSIS AUTO W/O SCOPE: CPT

## 2020-03-14 PROCEDURE — 76377 3D RENDER W/INTRP POSTPROCES: CPT

## 2020-03-14 PROCEDURE — 96372 THER/PROPH/DIAG INJ SC/IM: CPT

## 2020-03-14 PROCEDURE — 99283 EMERGENCY DEPT VISIT LOW MDM: CPT

## 2020-03-14 PROCEDURE — 74176 CT ABD & PELVIS W/O CONTRAST: CPT

## 2020-03-14 PROCEDURE — 81015 MICROSCOPIC EXAM OF URINE: CPT

## 2020-03-14 NOTE — ER
Nurse's Notes                                                                                     

 [facility]                                                                 

Name: Nhan Santos                                                                             

Age: 35 yrs                                                                                       

Sex: Male                                                                                         

: 1985                                                                                   

MRN: [id number]                                                                                   

Arrival Date: 2020                                                                          

Time: 13:24                                                                                       

Account#: [id number]                                                                            

Bed 28                                                                                            

Private MD:                                                                                       

Diagnosis: Lumbago with sciatica, left side                                                       

                                                                                                  

Presentation:                                                                                     

                                                                                             

13:50 Chief complaint: Patient states: sharp pain in middle of lower back to spine down       dm5 

      through left hip and down through left leg. Coronavirus screen: The patient has NOT         

      traveled to a country currently being monitored by the [facility] within the last 14 days.         

      Proceed with normal triage procedures. The patient has NOT had contact with any known       

      and/or suspected case of coronavirus. Proceed with normal triage procedures. Ebola          

      Screen: Patient negative for fever greater than or equal to 101.5 degrees Fahrenheit,       

      and additional compatible Ebola Virus Disease symptoms Patient denies exposure to           

      infectious person. Patient denies travel to an Ebola-affected area in the 21 days           

      before illness onset. No symptoms or risks identified at this time. Initial Sepsis          

      Screen: Does the patient meet any 2 criteria? No. Patient's initial sepsis screen is        

      negative. Does the patient have a suspected source of infection? No. Patient's initial      

      sepsis screen is negative. Risk Assessment: Do you want to hurt yourself or someone         

      else? Patient reports no desire to harm self or others. Note pt also reports urgency        

      and frequency.                                                                              

13:50 Method Of Arrival: Ambulatory                                                           dm5 

13:50 Acuity: FLAVIO 3                                                                           dm5 

15:14 Onset of symptoms was 2020.                                                   ll1 

                                                                                                  

Historical:                                                                                       

- Allergies:                                                                                      

15:12 No Known Allergies;                                                                     ll1 

- PMHx:                                                                                           

15:12 Pancreatitis;                                                                           ll1 

- PSHx:                                                                                           

15:12 Cholecystectomy; 2 cervical fusion;                                                     ll1 

                                                                                                  

- Immunization history:: Last tetanus immunization: up to date.                                   

- Social history:: Smoking status: Patient denies any tobacco usage or history of.                

  Patient/guardian denies using alcohol, street drugs, tobacco products.                          

                                                                                                  

                                                                                                  

Screening:                                                                                        

15:13 Abuse screen: Denies threats or abuse. Nutritional screening: No deficits noted.        ll1 

      Tuberculosis screening: No symptoms or risk factors identified. Fall Risk Secondary         

      diagnosis (15 points) impaired mobility, Gait- Normal/Bed Rest/Wheelchair (0 pts) Total     

      Ramírez Fall Scale indicates No Risk (0-24 pts).                                              

                                                                                                  

Assessment:                                                                                       

15:09 General: Appears uncomfortable, Behavior is calm, cooperative. Pain: Complains of pain  ll1 

      in left lower back/left hip/left leg. Neuro: No deficits noted. Cardiovascular: No          

      deficits noted. Respiratory: No deficits noted. Musculoskeletal: Circulation, motion,       

      and sensation intact. Capillary refill < 3 seconds, Range of motion: intact in all          

      extremities, Reports pain in left leg.                                                      

                                                                                                  

Vital Signs:                                                                                      

13:50  / 84; Pulse 96; Resp 18; Temp 98.1; Pulse Ox 99% ; Weight 92.99 kg; Height 6 ft. dm5 

      (182.88 cm); Pain 6/10;                                                                     

16:25  / 79; Pulse 78; Resp 19; Pulse Ox 100% ; Pain 7/10;                              ll1 

13:50 Body Mass Index 27.80 (92.99 kg, 182.88 cm)                                             dm5 

                                                                                                  

ED Course:                                                                                        

13:24 Patient arrived in ED.                                                                  mr  

13:52 Triage completed.                                                                       dm5 

14:19 Alvaro Hardy NP is PHCP.                                                           pm1 

14:19 Octavio Patel MD is Attending Physician.                                             pm1 

14:30 Dandre Miranda RN is Primary Nurse.                                                     ll1 

15:13 Arm band placed on.                                                                     ll1 

15:13 Patient has correct armband on for positive identification. Bed in low position. Call   ll1 

      light in reach.                                                                             

15:13 No provider procedures requiring assistance completed. Patient did not have IV access   ll1 

      during this emergency room visit.                                                           

15:50 CT Stone Protocol In Process Unspecified.                                               EDMS

                                                                                                  

Administered Medications:                                                                         

15:03 Drug: Lidoderm 5 % (700 mg/patch) 1 patches {Note: left lower back.} Route: Topical;    ll1 

      Site: affected area;                                                                        

16:26 Follow up: Response: No adverse reaction; Pain is unchanged, physician notified; RASS:  ll1 

      Alert and Calm (0)                                                                          

15:03 Drug: Norco 10 mg-325 mg 1 tabs {Note: RASS 0.} Route: PO;                              ll1 

16:26 Follow up: Response: No adverse reaction; Pain is unchanged, physician notified; RASS:  ll1 

      Alert and Calm (0)                                                                          

15:04 Drug: Decadron 10 mg Route: IM; Site: right gluteus;                                    ll1 

16:26 Follow up: Response: No adverse reaction; Pain is unchanged, physician notified         ll1 

                                                                                                  

                                                                                                  

Outcome:                                                                                          

16:31 Discharge ordered by MD.                                                                pm1 

16:41 Discharged to home ambulatory.                                                          ll1 

16:41 Condition: stable                                                                           

16:41 Discharge instructions given to patient, Instructed on discharge instructions, follow       

      up and referral plans. the need for admit, no drinking with medication, no driving          

      heavy equipment, medication usage, Demonstrated understanding of instructions,              

      follow-up care, medications, Prescriptions given X 3.                                       

16:45 Patient left the ED.                                                                    ll1 

                                                                                                  

Signatures:                                                                                       

Dispatcher [employer]                           Valencia Aly, GEETA                    RN   dm5                                                  

Nubia Driver Patrick, NP                    NP   pm1                                                  

Dandre Miranda RN RN   ll1                                                  

                                                                                                  

**************************************************************************************************

## 2020-03-14 NOTE — EDPHYS
Physician Documentation                                                                           

 [facility]                                                                 

Name: Nhan Santos                                                                             

Age: 35 yrs                                                                                       

Sex: Male                                                                                         

: 1985                                                                                   

MRN: [id number]                                                                                   

Arrival Date: 2020                                                                          

Time: 13:24                                                                                       

Account#: [id number]                                                                            

Bed 28                                                                                            

Private MD:                                                                                       

ED Physician Octavio Patel                                                                      

HPI:                                                                                              

                                                                                             

14:52 This 35 yrs old  Male presents to ER via Ambulatory with complaints of Low     pm1 

      Back Pain, Hip Pain, Leg Pain.                                                              

14:52 The patient presents with pain that is acute. The symptoms are located in the left low  pm1 

      back. The pain radiates to the left leg. The problem was sustained from unknown cause.      

      Onset: The symptoms/episode began/occurred 3 day(s) ago. Modifying factors: The patient     

      symptoms are alleviated by nothing, the patient symptoms are aggravated by any              

      movement. Associated signs and symptoms: Pertinent negatives: abdominal pain, chest         

      pain, dysuria, fever, hematuria, numbness, tingling, vomiting. Severity of symptoms: in     

      the emergency department the symptoms are actually worse. The patient has not               

      experienced similar symptoms in the past. The patient has not recently seen a physician.    

                                                                                                  

Historical:                                                                                       

- Allergies:                                                                                      

15:12 No Known Allergies;                                                                     ll1 

- PMHx:                                                                                           

15:12 Pancreatitis;                                                                           ll1 

- PSHx:                                                                                           

15:12 Cholecystectomy; 2 cervical fusion;                                                     ll1 

                                                                                                  

- Immunization history:: Last tetanus immunization: up to date.                                   

- Social history:: Smoking status: Patient denies any tobacco usage or history of.                

  Patient/guardian denies using alcohol, street drugs, tobacco products.                          

                                                                                                  

                                                                                                  

ROS:                                                                                              

15:29 Constitutional: Negative for fever, chills, and weight loss, Cardiovascular: Negative   pm1 

      for chest pain, palpitations, and edema, Respiratory: Negative for shortness of breath,     

      cough, wheezing, and pleuritic chest pain, Abdomen/GI: Negative for abdominal pain,         

      nausea, vomiting, diarrhea, and constipation.                                               

15:29 : Negative for injury, bleeding, discharge, and swelling, MS/Extremity: Negative for      

      injury and deformity, Skin: Negative for injury, rash, and discoloration, Neuro:            

      Negative for headache, weakness, numbness, tingling, and seizure.                           

15:29 Back: Positive for radiated pain, of the left low back.                                     

                                                                                                  

Exam:                                                                                             

15:29 Constitutional:  This is a well developed, well nourished patient who is awake, alert,  pm1 

      and in no acute distress. Head/Face:  Normocephalic, atraumatic. Neck:  Trachea             

      midline, no thyromegaly or masses palpated, and no cervical lymphadenopathy.  Supple,       

      full range of motion without nuchal rigidity, or vertebral point tenderness.  No            

      Meningismus. Chest/axilla:  Normal chest wall appearance and motion.  Nontender with no     

      deformity.  No lesions are appreciated. Cardiovascular:  Regular rate and rhythm with a     

      normal S1 and S2.  No gallops, murmurs, or rubs.  Normal PMI, no JVD.  No pulse             

      deficits. Respiratory:  Lungs have equal breath sounds bilaterally, clear to                

      auscultation and percussion.  No rales, rhonchi or wheezes noted.  No increased work of     

      breathing, no retractions or nasal flaring. Abdomen/GI:  Soft, non-tender, with normal      

      bowel sounds.  No distension or tympany.  No guarding or rebound.  No evidence of           

      tenderness throughout.                                                                      

15:29 Skin:  Warm, dry with normal turgor.  Normal color with no rashes, no lesions, and no       

      evidence of cellulitis. MS/ Extremity:  Pulses equal, no cyanosis.  Neurovascular           

      intact.  Full, normal range of motion.                                                      

15:29 Back: pain, that is moderate, of the  left mid back, normal spinal alignment noted.         

15:29 Neuro: Orientation: is normal, Motor: is normal, moves all fours, Sensation: is normal,     

      no obvious gross deficits.                                                                  

                                                                                                  

Vital Signs:                                                                                      

13:50  / 84; Pulse 96; Resp 18; Temp 98.1; Pulse Ox 99% ; Weight 92.99 kg; Height 6 ft. dm5 

      (182.88 cm); Pain 6/10;                                                                     

16:25  / 79; Pulse 78; Resp 19; Pulse Ox 100% ; Pain 7/10;                              ll1 

13:50 Body Mass Index 27.80 (92.99 kg, 182.88 cm)                                             dm5 

                                                                                                  

MDM:                                                                                              

14:24 Patient medically screened.                                                             oliver 

15:32 ED course: patient with left flank pain radiating down left leg. discussed hematuria    pm1 

      and patient reports history of kidney stone. He would like CT to rule out possibility       

      of kidney stone.                                                                            

15:37 Data reviewed: vital signs. Data interpreted: Pulse oximetry: on room air is 99 %.      pm1 

      Interpretation: normal.                                                                     

16:30 Counseling: I had a detailed discussion with the patient and/or guardian regarding: the pm1 

      historical points, exam findings, and any diagnostic results supporting the                 

      discharge/admit diagnosis, lab results, radiology results, the need for outpatient          

      follow up, to return to the emergency department if symptoms worsen or persist or if        

      there are any questions or concerns that arise at home.                                     

                                                                                                  

                                                                                             

14:03 Order name: Urine Dipstick--Ancillary (enter results); Complete Time: 15:28             eb  

                                                                                             

14:03 Order name: Urine Microscopic Only; Complete Time: 15:35                                eb  

                                                                                             

15:32 Order name: CT Stone Protocol; Complete Time: 16:30                                     pm1 

                                                                                                  

Administered Medications:                                                                         

15:03 Drug: Lidoderm 5 % (700 mg/patch) 1 patches {Note: left lower back.} Route: Topical;    ll1 

      Site: affected area;                                                                        

16:26 Follow up: Response: No adverse reaction; Pain is unchanged, physician notified; RASS:  ll1 

      Alert and Calm (0)                                                                          

15:03 Drug: Norco 10 mg-325 mg 1 tabs {Note: RASS 0.} Route: PO;                              ll1 

16:26 Follow up: Response: No adverse reaction; Pain is unchanged, physician notified; RASS:  ll1 

      Alert and Calm (0)                                                                          

15:04 Drug: Decadron 10 mg Route: IM; Site: right gluteus;                                    ll1 

16:26 Follow up: Response: No adverse reaction; Pain is unchanged, physician notified         ll1 

                                                                                                  

                                                                                                  

Disposition:                                                                                      

20 16:31 Discharged to Home. Impression: Lumbago with sciatica, left side.                  

- Condition is Stable.                                                                            

- Discharge Instructions: Back Pain, Adult, Sciatica.                                             

- Prescriptions for Tylenol- Codeine #3 300-30 mg Oral Tablet - take 2 tablets by ORAL            

  route every 6 hours As needed; 20 tablet. Lidoderm 5 % Topical adhesive                         

  patch,medicated - apply 1 patch by TRANSDERMAL route once daily As needed; 30                   

  Transdermal Patch. Cyclobenzaprine 10 mg Oral Tablet - take 1 tablet by ORAL route              

  every 8 hours As needed; 30 tablet.                                                             

- Work release form, Medication Reconciliation Form, Thank You Letter, Antibiotic                 

  Education, Prescription Opioid Use form.                                                        

- Follow up: Emergency Department; When: As needed; Reason: Worsening of condition.               

  Follow up: Private Physician; When: 2 - 3 days; Reason: Recheck today's complaints,             

  Continuance of care, Re-evaluation by your physician.                                           

- Problem is new.                                                                                 

- Symptoms have improved.                                                                         

                                                                                                  

                                                                                                  

                                                                                                  

Addendum:                                                                                         

2020                                                                                        

     09:08 Co-signature as Attending Physician, Octavio Patel MD I agree with the assessment and  c
ha

           plan of care.                                                                          

                                                                                                  

Signatures:                                                                                       

Dispatcher [employer]                           EDMS                                                 

Octavio Patel MD MD cha Marinas, Patrick, NP                    NP   pm1                                                  

Dandre Miranda RN                       RN   ll1                                                  

                                                                                                  

Corrections: (The following items were deleted from the chart)                                    

                                                                                             

16:45 16:31 2020 16:31 Discharged to Home. Impression: Lumbago with sciatica, left      ll1 

      side. Condition is Stable. Forms are Medication Reconciliation Form, Thank You Letter,      

      Antibiotic Education, Prescription Opioid Use. Follow up: Emergency Department; When:       

      As needed; Reason: Worsening of condition. Follow up: Private Physician; When: 2 - 3        

      days; Reason: Recheck today's complaints, Continuance of care, Re-evaluation by your        

      physician. Problem is new. Symptoms have improved. pm1                                      

                                                                                                  

**************************************************************************************************

## 2020-03-14 NOTE — RAD REPORT
EXAM DESCRIPTION:  CT - Stone Protocol - 3/14/2020 3:41 pm

 

CLINICAL HISTORY:  FLANK PAIN, left-sided

 

COMPARISON:  Abdomen   Pelvis W Contrast dated 12/2/2019

 

TECHNIQUE:  Axial 3 mm thick images were obtained without oral or IV contrast. The field-of-view span
s the entirety of the  system including uppermost abdomen and lung bases.

 

All CT scans are performed using dose optimization technique as appropriate and may include automated
 exposure control or mA/KV adjustment according to patient size.

 

FINDINGS:  No hydronephrosis is present and no obstructing ureteral calculi. No suspicious renal mass
es. Isodense masses and pyelonephritis are not excluded on a stone protocol CT scan. No urinary bladd
er suspicious finding. No significant adrenal finding. Multiple pelvic floor phleboliths are present.
 Pattern matches comparison.

 

Imaged portions of the liver, spleen and pancreas show no suspicious findings on non-contrast imaging
. Gallbladder is absent. No biliary tree dilatation. No pancreatitis findings.

 

No suspicious bowel findings.

 

No hernia, mass or bulky lymphadenopathy noted. No free air, free fluid or inflammatory stranding.

 

No significant bony abnormality.

 

IMPRESSION:  No hydronephrosis, obstructing calculus or acute  finding.

 

Isodense masses and pyelonephritis are not excluded on stone protocol technique.

 

No pancreatitis or other acute abdominal or pelvic process.

## 2020-03-17 ENCOUNTER — HOSPITAL ENCOUNTER (EMERGENCY)
Dept: HOSPITAL 97 - ER | Age: 35
Discharge: HOME | End: 2020-03-17
Payer: COMMERCIAL

## 2020-03-17 VITALS — TEMPERATURE: 97.7 F

## 2020-03-17 VITALS — OXYGEN SATURATION: 99 %

## 2020-03-17 VITALS — SYSTOLIC BLOOD PRESSURE: 135 MMHG | DIASTOLIC BLOOD PRESSURE: 82 MMHG

## 2020-03-17 DIAGNOSIS — M54.5: Primary | ICD-10-CM

## 2020-03-17 DIAGNOSIS — R31.9: ICD-10-CM

## 2020-03-17 LAB
BUN BLD-MCNC: 16 MG/DL (ref 7–18)
GLUCOSE SERPLBLD-MCNC: 339 MG/DL (ref 74–106)
HCT VFR BLD CALC: 46.9 % (ref 39.6–49)
LYMPHOCYTES # SPEC AUTO: 1.7 K/UL (ref 0.7–4.9)
PMV BLD: 9.6 FL (ref 7.6–11.3)
POTASSIUM SERPL-SCNC: 3.7 MMOL/L (ref 3.5–5.1)
RBC # BLD: 5.31 M/UL (ref 4.33–5.43)

## 2020-03-17 PROCEDURE — 85025 COMPLETE CBC W/AUTO DIFF WBC: CPT

## 2020-03-17 PROCEDURE — 74176 CT ABD & PELVIS W/O CONTRAST: CPT

## 2020-03-17 PROCEDURE — 81015 MICROSCOPIC EXAM OF URINE: CPT

## 2020-03-17 PROCEDURE — 36415 COLL VENOUS BLD VENIPUNCTURE: CPT

## 2020-03-17 PROCEDURE — 99284 EMERGENCY DEPT VISIT MOD MDM: CPT

## 2020-03-17 PROCEDURE — 76377 3D RENDER W/INTRP POSTPROCES: CPT

## 2020-03-17 PROCEDURE — 80048 BASIC METABOLIC PNL TOTAL CA: CPT

## 2020-03-17 PROCEDURE — 81003 URINALYSIS AUTO W/O SCOPE: CPT

## 2020-03-17 PROCEDURE — 96361 HYDRATE IV INFUSION ADD-ON: CPT

## 2020-03-17 PROCEDURE — 96374 THER/PROPH/DIAG INJ IV PUSH: CPT

## 2020-03-17 NOTE — ER
Nurse's Notes                                                                                     

 [facility]                                                                 

Name: Nhan Santos                                                                             

Age: 35 yrs                                                                                       

Sex: Male                                                                                         

: 1985                                                                                   

MRN: [id number]                                                                                   

Arrival Date: 2020                                                                          

Time: 07:10                                                                                       

Account#: [id number]                                                                            

Bed 20                                                                                            

Private MD:                                                                                       

Diagnosis: Low back pain;Hematuria                                                                

                                                                                                  

Presentation:                                                                                     

                                                                                             

07:15 Chief complaint: Patient states: L flank pain and intermittent blood in urine that      ss  

      began a week ago. Pt was seen in ER and said that all his test results came back okay,      

      but is is worried because of the blood in his urine and is now having pain to bilateral     

      flank areas. Coronavirus screen: The patient has NOT traveled to a country currently        

      being monitored by the CDC within the last 14 days. Ebola Screen: Patient denies            

      exposure to infectious person. Patient denies travel to an Ebola-affected area in the       

      21 days before illness onset. Initial Sepsis Screen: Does the patient meet any 2            

      criteria? No. Patient's initial sepsis screen is negative. Does the patient have a          

      suspected source of infection? No. Patient's initial sepsis screen is negative. Risk        

      Assessment: Do you want to hurt yourself or someone else? Patient reports no desire to      

      harm self or others.                                                                        

07:15 Method Of Arrival: Ambulatory                                                           ss  

07:15 Acuity: FLAVIO 3                                                                           ss  

                                                                                                  

Historical:                                                                                       

- Allergies:                                                                                      

07:22 No Known Allergies;                                                                     ss  

- PMHx:                                                                                           

07:22 Pancreatitis;                                                                           ss  

- PSHx:                                                                                           

07:22 Cholecystectomy; 2 cervical fusion;                                                     ss  

                                                                                                  

- Immunization history:: Adult Immunizations up to date.                                          

- Social history:: Smoking status: Patient denies any tobacco usage or history of.                

                                                                                                  

                                                                                                  

Screenin:30 Abuse screen: Denies threats or abuse. Nutritional screening: No deficits noted.        em  

      Tuberculosis screening: No symptoms or risk factors identified. Fall Risk None              

      identified.                                                                                 

                                                                                                  

Assessment:                                                                                       

07:30 General: Appears in no apparent distress. uncomfortable, Behavior is calm, cooperative, em  

      Reports fever for 12-24 hours. Pain: Complains of pain in right flank and left flank        

      Pain currently is 7 out of 10 on a pain scale. Pain began 1 week. Neuro: Level of           

      Consciousness is awake, alert, obeys commands, Oriented to person, place, time,             

      situation, Appropriate for age. Cardiovascular: Capillary refill < 3 seconds Patient's      

      skin is warm and dry. Respiratory: Airway is patent Respiratory effort is even,             

      unlabored, Respiratory pattern is regular, symmetrical. GI: Abdomen is flat, Bowel          

      sounds present X 4 quads. Patient currently denies nausea, vomiting. : Urine is           

      clear, Reports burning with urination, reports blood in urine. Derm: Skin is intact, is     

      healthy with good turgor, Skin is pink, warm \T\ dry. Musculoskeletal: Capillary refill <   

      3 seconds, Range of motion: intact in all extremities.                                      

08:15 Reassessment: Patient appears in no apparent distress at this time. Patient and/or      em  

      family updated on plan of care and expected duration. Pain level reassessed. Patient is     

      alert, oriented x 3, equal unlabored respirations, skin warm/dry/pink. rates pain 5/10      

      Patient states feeling better. Patient states symptoms have improved.                       

10:22 Reassessment: Patient appears in no apparent distress at this time. Patient and/or      em  

      family updated on plan of care and expected duration. Pain level reassessed. Patient is     

      alert, oriented x 3, equal unlabored respirations, skin warm/dry/pink. Patient states       

      feeling better.                                                                             

                                                                                                  

Vital Signs:                                                                                      

07:15  / 90; Pulse 94; Resp 16; Temp 97.7(TE); Pulse Ox 100% on R/A; Weight 92.99 kg;   ss  

      Height 6 ft. 0 in. (182.88 cm); Pain 7/10;                                                  

08:15  / 91; Pulse 78; Resp 18; Pulse Ox 99% on R/A; Pain 4/10;                         em  

09:15  / 93; Pulse 70; Resp 18; Pulse Ox 99% on R/A;                                    em  

10:15  / 82; Pulse 64; Resp 18; Pulse Ox 99% on R/A;                                    em  

07:15 Body Mass Index 27.80 (92.99 kg, 182.88 cm)                                               

                                                                                                  

ED Course:                                                                                        

07:10 Patient arrived in ED.                                                                  rg4 

07:11 [facility]                                                        kb  

07:11 Iftikhar Frankel MD is Attending Physician.                                                kb  

07:12 Andrea Paz, RN is Primary Nurse.                                                      em  

07:22 Triage completed.                                                                       ss  

07:22 Arm band placed on right wrist.                                                         ss  

07:30 Patient has correct armband on for positive identification. Placed in gown. Bed in low  em  

      position. Call light in reach. Pulse ox on. NIBP on.                                        

07:40 Initial lab(s) drawn, by me, sent to lab. Inserted saline lock: 20 gauge in right       em  

      antecubital area, using aseptic technique. Blood collected.                                 

08:25 CT Stone Protocol In Process Unspecified.                                               EDMS

10:21 No provider procedures requiring assistance completed. IV discontinued, intact,         em  

      bleeding controlled, No redness/swelling at site. Pressure dressing applied.                

                                                                                                  

Administered Medications:                                                                         

07:40 Drug: NS 0.9% 1000 ml Route: IV; Rate: 1000 ml; Site: right antecubital;                em  

10:01 Follow up: IV Status: Completed infusion; IV Intake: 1000ml                             em  

07:40 Drug: TORadol - Ketorolac 15 mg Route: IVP; Site: right antecubital;                    em  

                                                                                                  

                                                                                                  

Intake:                                                                                           

10:01 IV: 1000ml; Total: 1000ml.                                                              em  

                                                                                                  

Outcome:                                                                                          

10:02 Discharge ordered by MD.                                                                kb  

10:23 Discharged to home ambulatory.                                                          em  

10:23 Condition: good                                                                             

10:23 Discharge instructions given to patient, Instructed on discharge instructions, follow       

      up and referral plans. Demonstrated understanding of instructions, follow-up care.          

10:24 Patient left the ED.                                                                    em  

                                                                                                  

Signatures:                                                                                       

Dispatcher [employer]                           EDMS                                                 

Riya Ramires, FNP-C                 JINP-Andrea Ray, RN                        RN   Lizy Forbes RN                      RN   Dulce Maria Ruiz                                 rg4                                                  

                                                                                                  

**************************************************************************************************

## 2020-03-17 NOTE — RAD REPORT
EXAM DESCRIPTION:  CT - Stone Protocol - 3/17/2020 8:24 am

 

CLINICAL HISTORY:  FLANK PAIN, left flank pain, intermittent hematuria

 

COMPARISON:  Stone Protocol dated 3/14/2020

 

TECHNIQUE:  Axial 3 mm thick images were obtained without oral or IV contrast. The field-of-view span
s the entirety of the  system including uppermost abdomen and lung bases.

 

All CT scans are performed using dose optimization technique as appropriate and may include automated
 exposure control or mA/KV adjustment according to patient size.

 

FINDINGS:  No hydronephrosis is present and no obstructing ureteral calculi. No suspicious renal mass
es. Isodense masses and pyelonephritis are not excluded on a stone protocol CT scan. No urinary bladd
er suspicious finding. No significant adrenal finding. Patient has multiple phleboliths in the pelvis
 including a 10 millimeter calculus in close proximity to the left ureter. Calcification within the G
U system is not confirmed.

 

Imaged portions of the liver, spleen and pancreas show no suspicious findings on non-contrast imaging
. Gallbladder is surgically absent. No biliary tree dilatation.

 

No suspicious bowel findings. The appendix is not identified. There are clips that with the cecum pre
sumed to be appendectomy clips. Moderate stool volume is present in the colon. No active process seen
.

 

No hernia, mass or bulky lymphadenopathy noted. No free air, free fluid or inflammatory stranding.

 

No significant bony abnormality.

 

IMPRESSION:  No hydronephrosis or obstructing calculus. Multiple calcifications in the pelvis are all
 believed to be phleboliths. No acute  finding identifiable.

 

Isodense masses and pyelonephritis are not excluded on stone protocol technique.Overall assessment is
 limited in the absence of IV contrast.

 

No acute GI process. No active abdominal or pelvic process seen. No identifiable changes from March 1
4.

## 2020-03-17 NOTE — EDPHYS
Physician Documentation                                                                           

 [facility]                                                                 

Name: Nhan Santos                                                                             

Age: 35 yrs                                                                                       

Sex: Male                                                                                         

: 1985                                                                                   

MRN: [id number]                                                                                   

Arrival Date: 2020                                                                          

Time: 07:10                                                                                       

Account#: [id number]                                                                            

Bed 20                                                                                            

Private MD:                                                                                       

ED Physician Iftikhar Frankel                                                                         

HPI:                                                                                              

                                                                                             

07:17 This 35 yrs old  Male presents to ER via Unassigned with complaints of Low     kb  

      Back Pain, Blood In Urine.                                                                  

07:18 The patient complains of pain in the left flank and right flank. The pain radiates.     kb  

      Onset: The symptoms/episode began/occurred last week. Modifying factors: The symptoms       

      are alleviated by nothing. the symptoms are aggravated by palpation/percussion.             

      Associated signs and symptoms: Pertinent positives:.                                        

09:58 Severity of pain: At its worst the pain was moderate in the emergency department the    kb  

      pain is unchanged. The patient has experienced a previous episode. The patient has been     

      recently seen by a physician:.                                                              

10:01 Pt reports low back pain and hematuria. States the pain started on the left side and    kb  

      radiated down left leg a few days ago. Now reports pain is on right side as well with       

      no radiation. Denies fever, burning with urination.                                         

                                                                                                  

Historical:                                                                                       

- Allergies:                                                                                      

07:22 No Known Allergies;                                                                     ss  

- PMHx:                                                                                           

07:22 Pancreatitis;                                                                           ss  

- PSHx:                                                                                           

07:22 Cholecystectomy; 2 cervical fusion;                                                     ss  

                                                                                                  

- Immunization history:: Adult Immunizations up to date.                                          

- Social history:: Smoking status: Patient denies any tobacco usage or history of.                

                                                                                                  

                                                                                                  

ROS:                                                                                              

07:16 Constitutional: Negative for fever, chills, and weight loss, Neck: Negative for injury, kb  

      pain, and swelling, Cardiovascular: Negative for chest pain, palpitations, and edema,       

      Respiratory: Negative for shortness of breath, cough, wheezing, and pleuritic chest         

      pain, Abdomen/GI: Negative for abdominal pain, nausea, vomiting, diarrhea, and              

      constipation, MS/Extremity: Negative for injury and deformity, Skin: Negative for           

      injury, rash, and discoloration, Neuro: Negative for headache, weakness, numbness,          

      tingling, and seizure.                                                                      

07:16 Back: Positive for pain at rest, pain with movement, radiated pain, of the low back         

      area.                                                                                       

07:16 : Positive for hematuria.                                                                 

                                                                                                  

Exam:                                                                                             

07:16 Constitutional:  This is a well developed, well nourished patient who is awake, alert,  kb  

      and in no acute distress. Head/Face:  Normocephalic, atraumatic. Neck:  Trachea             

      midline, no thyromegaly or masses palpated, and no cervical lymphadenopathy.  Supple,       

      full range of motion without nuchal rigidity, or vertebral point tenderness.  No            

      Meningismus. Chest/axilla:  Normal chest wall appearance and motion.  Nontender with no     

      deformity.  No lesions are appreciated. Cardiovascular:  Regular rate and rhythm with a     

      normal S1 and S2.  No gallops, murmurs, or rubs.  Normal PMI, no JVD.  No pulse             

      deficits. Respiratory:  Lungs have equal breath sounds bilaterally, clear to                

      auscultation and percussion.  No rales, rhonchi or wheezes noted.  No increased work of     

      breathing, no retractions or nasal flaring. Abdomen/GI:  Soft, non-tender, with normal      

      bowel sounds.  No distension or tympany.  No guarding or rebound.  No evidence of           

      tenderness throughout. Skin:  Warm, dry with normal turgor.  Normal color with no           

      rashes, no lesions, and no evidence of cellulitis. MS/ Extremity:  Pulses equal, no         

      cyanosis.  Neurovascular intact.  Full, normal range of motion. Neuro:  Awake and           

      alert, GCS 15, oriented to person, place, time, and situation.  Cranial nerves II-XII       

      grossly intact.  Motor strength 5/5 in all extremities.  Sensory grossly intact.            

      Cerebellar exam normal.  Normal gait.                                                       

07:16 Back: CVA tenderness, that is moderate, is noted on the right, is noted on the left.        

                                                                                                  

Vital Signs:                                                                                      

07:15  / 90; Pulse 94; Resp 16; Temp 97.7(TE); Pulse Ox 100% on R/A; Weight 92.99 kg;   ss  

      Height 6 ft. 0 in. (182.88 cm); Pain 7/10;                                                  

08:15  / 91; Pulse 78; Resp 18; Pulse Ox 99% on R/A; Pain 4/10;                         em  

09:15  / 93; Pulse 70; Resp 18; Pulse Ox 99% on R/A;                                    em  

10:15  / 82; Pulse 64; Resp 18; Pulse Ox 99% on R/A;                                    em  

07:15 Body Mass Index 27.80 (92.99 kg, 182.88 cm)                                             ss  

                                                                                                  

MDM:                                                                                              

07:11 Patient medically screened.                                                             kb  

07:16 Data reviewed: vital signs, nurses notes. Data interpreted: Pulse oximetry: on room air kb  

      is 100 %. Interpretation: normal.                                                           

08:56 Counseling: I had a detailed discussion with the patient and/or guardian regarding: the kb  

      historical points, exam findings, and any diagnostic results supporting the                 

      discharge/admit diagnosis, lab results, radiology results, the need for outpatient          

      follow up, a family practitioner, a urologist, to return to the emergency department if     

      symptoms worsen or persist or if there are any questions or concerns that arise at home.    

                                                                                                  

                                                                                             

07:15 Order name: Basic Metabolic Panel; Complete Time: 08:11                                 kb  

                                                                                             

07:15 Order name: CBC with Diff; Complete Time: 08:11                                         kb  

                                                                                             

07:15 Order name: Urine Microscopic Only; Complete Time: 08:11                                kb  

                                                                                             

07:55 Order name: Urine Dipstick--Ancillary (enter results)                                   bd  

                                                                                             

08:11 Order name: Hemoglobin A1c                                                              kb  

                                                                                             

08:13 Order name: CT Stone Protocol; Complete Time: 08:56                                     kb  

                                                                                             

07:15 Order name: IV Saline Lock; Complete Time: 07:48                                        kb  

                                                                                             

07:15 Order name: Labs collected and sent; Complete Time: 07:48                               kb  

                                                                                             

07:15 Order name: Urine Dipstick-Ancillary (obtain specimen); Complete Time: 07:48            kb  

                                                                                                  

Administered Medications:                                                                         

07:40 Drug: NS 0.9% 1000 ml Route: IV; Rate: 1000 ml; Site: right antecubital;                em  

10:01 Follow up: IV Status: Completed infusion; IV Intake: 1000ml                             em  

07:40 Drug: TORadol - Ketorolac 15 mg Route: IVP; Site: right antecubital;                    em  

                                                                                                  

                                                                                                  

Disposition:                                                                                      

11:23 Co-signature as Attending Physician, Iftikhar Frankel MD.                                    rn  

                                                                                                  

Disposition:                                                                                      

20 10:02 Discharged to Home. Impression: Low back pain, Hematuria.                          

- Condition is Stable.                                                                            

- Discharge Instructions: Back Pain, Adult, Easy-to-Read, Flank Pain, Easy-to-Read.               

                                                                                                  

- Medication Reconciliation Form, Thank You Letter, Antibiotic Education, Prescription            

  Opioid Use form.                                                                                

- Follow up: Emergency Department; When: As needed; Reason: Worsening of condition.               

  Follow up: Private Physician; When: 2 - 3 days; Reason: Recheck today's complaints,             

  Continuance of care, Re-evaluation by your physician.                                           

                                                                                                  

                                                                                                  

                                                                                                  

Signatures:                                                                                       

Dispatcher [employer]                           Riya Bocanegra, STARLA SCHULER-Andrea Ray, Iftikhar Patino RN, MD MD rn Smirch, Shelby, RN RN   ss                                                   

                                                                                                  

Corrections: (The following items were deleted from the chart)                                    

10:24 10:02 2020 10:02 Discharged to Home. Impression: Low back pain; Hematuria.        em  

      Condition is Stable. Forms are Medication Reconciliation Form, Thank You Letter,            

      Antibiotic Education, Prescription Opioid Use. Follow up: Emergency Department; When:       

      As needed; Reason: Worsening of condition. Follow up: Private Physician; When: 2 - 3        

      days; Reason: Recheck today's complaints, Continuance of care, Re-evaluation by your        

      physician. kb                                                                               

                                                                                                  

**************************************************************************************************

## 2021-03-09 ENCOUNTER — HOSPITAL ENCOUNTER (EMERGENCY)
Dept: HOSPITAL 97 - ER | Age: 36
LOS: 1 days | Discharge: PSYCHIATRIC OR SUBSTANCE USE FACILITY | End: 2021-03-10
Payer: COMMERCIAL

## 2021-03-09 DIAGNOSIS — F25.9: ICD-10-CM

## 2021-03-09 DIAGNOSIS — Z20.822: ICD-10-CM

## 2021-03-09 DIAGNOSIS — F29: Primary | ICD-10-CM

## 2021-03-09 DIAGNOSIS — E11.9: ICD-10-CM

## 2021-03-09 LAB
HCT VFR BLD CALC: 41.3 % (ref 39.6–49)
LYMPHOCYTES # SPEC AUTO: 1.9 K/UL (ref 0.7–4.9)
PMV BLD: 9 FL (ref 7.6–11.3)
RBC # BLD: 4.69 M/UL (ref 4.33–5.43)

## 2021-03-09 PROCEDURE — 99285 EMERGENCY DEPT VISIT HI MDM: CPT

## 2021-03-09 PROCEDURE — 80048 BASIC METABOLIC PNL TOTAL CA: CPT

## 2021-03-09 PROCEDURE — 80320 DRUG SCREEN QUANTALCOHOLS: CPT

## 2021-03-09 PROCEDURE — 81003 URINALYSIS AUTO W/O SCOPE: CPT

## 2021-03-09 PROCEDURE — 80329 ANALGESICS NON-OPIOID 1 OR 2: CPT

## 2021-03-09 PROCEDURE — 36415 COLL VENOUS BLD VENIPUNCTURE: CPT

## 2021-03-09 PROCEDURE — 80076 HEPATIC FUNCTION PANEL: CPT

## 2021-03-09 PROCEDURE — 93005 ELECTROCARDIOGRAM TRACING: CPT

## 2021-03-09 PROCEDURE — 85610 PROTHROMBIN TIME: CPT

## 2021-03-09 PROCEDURE — 85025 COMPLETE CBC W/AUTO DIFF WBC: CPT

## 2021-03-09 PROCEDURE — 85730 THROMBOPLASTIN TIME PARTIAL: CPT

## 2021-03-09 PROCEDURE — 80307 DRUG TEST PRSMV CHEM ANLYZR: CPT

## 2021-03-09 NOTE — XMS REPORT
Continuity of Care Document

                            Created on:2021



Patient:LEMUEL GARCIA

Sex:Male

:1985

External Reference #:[id number]





Demographics







                          Address                   [address] [place] [address] [address]



                                                    [place] [address]

 

                          Home Phone                [phone]

 

                          Email Address             [email or web address]

 

                          Preferred Language        English

 

                          Marital Status            Unknown

 

                          Rastafarian Affiliation     Unknown

 

                          Race                      Unknown

 

                          Additional Race(s)        Unavailable



                                                    White

 

                          Ethnic Group              Not  or 









Author







                          Organization              [facility]

t

 

                          Address                   [address]



                                                    [place] [address]

 

                          Phone                     [phone]









Support







                Name            Relationship    Address         Phone

 

                Faisal           Mother          Unavailable     [phone]









Care Team Providers







                    Name                Role                Phone

 

                    VIVIAN Neville MD        Attending Clinician [phone]

 

                    CL Gilmore        Attending Clinician [phone]









Problems

This patient has no known problems.



Allergies, Adverse Reactions, Alerts

This patient has no known allergies or adverse reactions.



Medications

This patient has no known medications.



Procedures

This patient has no known procedures.



Encounters







        Start   End     Encounter Admission Attending Care    Care    Encounter 

Source



        Date/Time Date/Time Type    Type    Clinicians Facility Department ID   

   

 

        2020 [facility]     [id number]

[id number] 



        12:08:23 23:59:00 Encounter         [facility] [id number]      

   



                                                [facility] [id number]         



                                                [place]  [phone]         



                                                        060             

 

        2020 Emergency         [facility]    [id number] 

[id number] 



        06:35:12 13:40:00                 CL [facility] [id number]         



                                                [place] [id number]         



                                                [place]  [phone]         



                                                        084             







Results







           Test Description Test Time  Test Comments Results    Result Comments 

Source









                    RPR Qualitative     2020 12:17:09 









                      Test Item  Value      Reference Range Interpretation Comme

nts









             RPR Qual (test code = RPR Qual) Non-Reactive Non-Reactive          

    

 

             Reactive Control (test code = Reactive Control) Reactive           

                    

 

             Weak Reactive Control (test code = Weak Reactive Weak Reactive     

                      



             Control)                                            

 

             Non-Reactive Control (test code = Non-Reactive Non-Reactive        

                   



             Control)                                            

 

             Lot # (test code = Lot #) [id number]                    N            

 

             Expiration Dt (test code = Expiration Dt) 12-               

 N            



Thyroid Stimulating [id number] 07:34:05





             Test Item    Value        Reference Range Interpretation Comments

 

             TSH (test code = TSH) 1.820 mIU/mL 0.270-4.200               



Lipid [id number] 07:34:04





             Test Item    Value        Reference Range Interpretation Comments

 

             Cholesterol Total 204 mg/dL    0-200        H            RISK OF HE

ART



             (test code =                                        DISEASEPublishe

d by



             Cholesterol Total)                                        American 

Heart



                                                                 Association [facility]

lyte



                                                                 Optimal Borderl

ine



                                                                 Increased RiskC

HOL



                                                                 <200 200-239 >2

40TRIG



                                                                 <150 150-199 >2

00HDL



                                                                 Male  >60  <40H

DL



                                                                 Female  >60  <5

0LDL



                                                                 <100 130-159 >1

60LDL



                                                                 Near optimal is

 100-129

 

             Triglycerides (test 197 mg/dL    9-200                     



             code = Triglycerides)                                        

 

             HDL (test code = HDL) 42 mg/dL     40-60                     

 

             LDL (test code = LDL) 122 mg/dL    0-130                     The eq

uation being used



                                                                 in this calcula

tion is



                                                                 LDL = (Chol - H

DL) -



                                                                 (Trig / 5)

 

             VLDL (test code = 39 mg/dL     5-40                      The equati

on being used



             VLDL)                                               in this calcula

tion is



                                                                 VLDL = Trig / 5

 

             Chol/HDL (test code = 4.9 ratio    0.0-5.0                   



             Chol/HDL)                                           

 

             LDL/HDL Ratio (test 3                         N            The equa

tion being used



             code = LDL/HDL Ratio)                                        in thi

s calculation is



                                                                 LDL/HDL Ratio=L

DL



                                                                 Calc/HDL Chol

## 2021-03-10 VITALS — OXYGEN SATURATION: 99 % | SYSTOLIC BLOOD PRESSURE: 121 MMHG | DIASTOLIC BLOOD PRESSURE: 85 MMHG

## 2021-03-10 VITALS — TEMPERATURE: 98 F

## 2021-03-10 LAB
ALBUMIN SERPL BCP-MCNC: 4.4 G/DL (ref 3.4–5)
ALP SERPL-CCNC: 77 U/L (ref 45–117)
ALT SERPL W P-5'-P-CCNC: 39 U/L (ref 12–78)
AST SERPL W P-5'-P-CCNC: 23 U/L (ref 15–37)
BUN BLD-MCNC: 20 MG/DL (ref 7–18)
GLUCOSE SERPLBLD-MCNC: 163 MG/DL (ref 74–106)
INR BLD: 1.04
METHAMPHET UR QL SCN: NEGATIVE
POTASSIUM SERPL-SCNC: 3.1 MMOL/L (ref 3.5–5.1)
THC SERPL-MCNC: NEGATIVE NG/ML

## 2021-03-10 NOTE — ER
Nurse's Notes                                                                                     

 [facility]                                                                 

Name: Nhan Santos                                                                             

Age: 36 yrs                                                                                       

Sex: Male                                                                                         

: 1985                                                                                   

MRN: [id number]                                                                                   

Arrival Date: 2021                                                                          

Time: 22:19                                                                                       

Account#: [id number]                                                                            

Bed 16                                                                                            

Private MD:                                                                                       

Diagnosis: Psychosis                                                                              

                                                                                                  

Presentation:                                                                                     

                                                                                             

22:32 Chief complaint: Patient states: he takes meds for his schizo-affective disorder but he bb  

      ran out of them approx 2 weeks ago and now he is hearing voices telling him to kill         

      people denies suicidal ideations. Coronavirus screen: At this time, the client does not     

      indicate any symptoms associated with coronavirus-19. Ebola Screen: No symptoms or          

      risks identified at this time. Initial Sepsis Screen: Does the patient meet any 2           

      criteria? No. Patient's initial sepsis screen is negative. Does the patient have a          

      suspected source of infection? No. Patient's initial sepsis screen is negative. Risk        

      Assessment: Do you want to hurt yourself or someone else? Patient reports no desire to      

      harm self or others. Onset of symptoms was 2021.                                   

22:32 Method Of Arrival: Ambulatory                                                             

22:32 Acuity: FLAVIO 2                                                                           bb  

                                                                                                  

Historical:                                                                                       

- Allergies:                                                                                      

22:37 No Known Allergies;                                                                     bb  

- Home Meds:                                                                                      

22:37 risperidone 0.5 mg oral tab 2 tabs 2 times per day [Active]; Prazosin Oral [Active];    bb  

03/10                                                                                             

01:25 sertraline 200 mg tab oral tab 1 tab once daily [Active]; aripiprazole 20 mg oral tab 1 rr5 

      tab once daily [Active]; metformin 1,000 mg Oral tab 1 tab 2 times per day [Active];        

- PMHx:                                                                                           

                                                                                             

22:37 Pancreatitis; schizo-affective disorder;                                                bb  

03/10                                                                                             

03:15 Diabetes - NIDDM;                                                                       rr5 

- PSHx:                                                                                           

                                                                                             

22:37 Cholecystectomy; 2 cervical fusion; Appendectomy;                                       bb  

                                                                                                  

- Immunization history:: Adult Immunizations up to date.                                          

- Social history:: Smoking status: Patient denies any tobacco usage or history of.                

                                                                                                  

                                                                                                  

Screenin:58 Abuse screen: Denies threats or abuse. Denies injuries from another. Nutritional        rr5 

      screening: No deficits noted. Tuberculosis screening: No symptoms or risk factors           

      identified. Fall Risk IV access (20 points). Total Ramírez Fall Scale indicates No Risk       

      (0-24 pts).                                                                                 

                                                                                                  

Assessment:                                                                                       

23:58 General: Appears in no apparent distress. comfortable, Behavior is calm, cooperative,   rr5 

      quiet, Reports hearing voices. Pain: Denies pain. Neuro: Level of Consciousness is          

      awake, alert, obeys commands, Oriented to person, place, time. Cardiovascular:              

      Capillary refill < 3 seconds Patient's skin is warm and dry. Respiratory: Airway is         

      patent Respiratory effort is even, unlabored, Respiratory pattern is regular,               

      symmetrical. GI: No signs and/or symptoms were reported involving the gastrointestinal      

      system. : No signs and/or symptoms were reported regarding the genitourinary system.      

      EENT: No signs and/or symptoms were reported regarding the EENT system. Derm: Skin is       

      intact, is healthy with good turgor, Skin temperature is warm. Musculoskeletal:             

      Circulation, motion, and sensation intact. Capillary refill < 3 seconds.                    

03/10                                                                                             

02:39 Reassessment: [facility] [phone] talking to the patient over the phone.    rr5 

03:04 Reassessment: report given to [facility], woody staff nurse.              rr5 

03:18 Reassessment: report given to roya (Nurse) [facility]       rr5 

      liya number [phone].                                                                   

                                                                                                  

Vital Signs:                                                                                      

                                                                                             

22:32  / 97; Pulse 74; Resp 16 S; Temp 98(O); Pulse Ox 100% on R/A; Weight 92.99 kg     bb  

      (R); Height 5 ft. 11 in. (180.34 cm) (R); Pain 0/10;                                        

03/10                                                                                             

02:00  / 85; Pulse 72; Resp 18; Temp 98; Pulse Ox 99% ;                                 rr5 

                                                                                             

22:32 Body Mass Index 28.59 (92.99 kg, 180.34 cm)                                             bb  

                                                                                                  

ED Course:                                                                                        

                                                                                             

22:19 Patient arrived in ED.                                                                  cl3 

22:34 Triage completed.                                                                       bb  

22:37 Arm band placed on Patient placed in an exam room, on a stretcher.                      bb  

23:06 Adin Ang MD is Attending Physician.                                             mh7 

23:45 Urine collected: clean catch specimen, clear.                                           rr5 

23:50 Inserted saline lock: 20 gauge in right forearm, using aseptic technique. Blood         rr5 

      collected.                                                                                  

23:55 EKG done, by ED staff, reviewed by Adin Ang MD.                                   rr5 

23:57 Reynaldo Tafoya, RN is Primary Nurse.                                                    rr5 

23:59 Patient has correct armband on for positive identification. Bed in low position.        rr5 

03/10                                                                                             

01:41 called [facility] Crisis Line at [phone] spoke to Belinda to have a screener speak mw2 

      to patient.                                                                                 

02:31 faxed all patient information to all [facility] facilities.                                  mw2 

02:59 [facility] called to do nurse to nurse.                                     mw2 

03:23 doc to doc with Dr. Figueroa the psychiatrist from [facility].                   mw2 

03:25 administrative approval given by Santiago Arita/ patient has been accepted to [address]/ Dr. Figueroa has accepted the patient in transfer.                               

04:25 No provider procedures requiring assistance completed.                                  rr5 

04:25 IV discontinued, intact, bleeding controlled, No redness/swelling at site. Pressure     rr5 

      dressing applied.                                                                           

                                                                                                  

Administered Medications:                                                                         

03:17 Drug: Potassium Effervescent Tablet 50 mEq Route: PO;                                     

04:00 Follow up: Response: No adverse reaction                                                rr5 

                                                                                                  

                                                                                                  

Outcome:                                                                                          

03:28 ER care complete, transfer ordered by MD.                                               mh7 

04:25 Transferred by ground EMS to other acute care facility: [facility].        rr5 

      Transfer form completed.                                                                    

04:25 Condition: stable                                                                           

04:25 Instructed on the need for transfer.                                                    rr5 

04:28 Patient left the ED.                                                                    rr5 

                                                                                                  

Signatures:                                                                                       

Sheyla Coleman RN RN                                                      

Miri Spencer RN RN                                                      

Paulino Luke                            2                                                  

Reynaldo Tafoya, RN                      RN   5                                                  

Robbie Miranda                                [facility]                                                  

Adin Ang MD MD   7                                                  

                                                                                                  

Corrections: (The following items were deleted from the chart)                                    

01:25  22:37 Home Meds: aripiprazole oral oral; malena                                       rr5 

03/10                                                                                             

01:25 03 22:37 Home Meds: sertraline oral oral; malena                                         rr5 

                                                                                                  

**************************************************************************************************

## 2021-03-10 NOTE — EDPHYS
Physician Documentation                                                                           

 [facility]                                                                 

Name: Nhan Santos                                                                             

Age: 36 yrs                                                                                       

Sex: Male                                                                                         

: 1985                                                                                   

MRN: [id number]                                                                                   

Arrival Date: 2021                                                                          

Time: 22:19                                                                                       

Account#: [id number]                                                                            

Bed 16                                                                                            

Private MD:                                                                                       

ED Physician Adin Ang                                                                      

HPI:                                                                                              

                                                                                             

23:40 This 36 yrs old  Male presents to ER via Ambulatory with complaints of Hearing mh7 

      Voices.                                                                                     

23:40 The patient presents to the emergency department with psychosis, has experienced        mh7 

      auditory hallucinations, voices are telling patient to cause harm to someone else.          

23:40 Onset: The symptoms/episode began/occurred 5 day(s) ago. Past psychiatric history:      mh7 

      Prior diagnosis: Schizoaffective Disorder. Associated signs and symptoms: Pertinent         

      positives; hallucinations, Pertinent negatives: abdominal pain, anxiety, chest pain,        

      chills, delusions, fever, headache, nausea, night sweats, palpitations, paranoia,           

      shortness of breath, substance abuse, tremor, vomiting. Severity of symptoms: At their      

      worst the symptoms were moderate 2 day(s) ago, in the emergency department the symptoms     

      are unchanged. States that he has been hearing voices telling him to harm other people.     

      He ran out of his psych medication 2 weeks ago..                                            

                                                                                                  

Historical:                                                                                       

- Allergies:                                                                                      

22:37 No Known Allergies;                                                                     bb  

- Home Meds:                                                                                      

22:37 risperidone 0.5 mg oral tab 2 tabs 2 times per day [Active]; Prazosin Oral [Active];    bb  

03/10                                                                                             

01:25 sertraline 200 mg tab oral tab 1 tab once daily [Active]; aripiprazole 20 mg oral tab 1 rr5 

      tab once daily [Active]; metformin 1,000 mg Oral tab 1 tab 2 times per day [Active];        

- PMHx:                                                                                           

                                                                                             

22:37 Pancreatitis; schizo-affective disorder;                                                bb  

03/10                                                                                             

03:15 Diabetes - NIDDM;                                                                       rr5 

- PSHx:                                                                                           

                                                                                             

22:37 Cholecystectomy; 2 cervical fusion; Appendectomy;                                       bb  

                                                                                                  

- Immunization history:: Adult Immunizations up to date.                                          

- Social history:: Smoking status: Patient denies any tobacco usage or history of.                

                                                                                                  

                                                                                                  

ROS:                                                                                              

23:40 Constitutional: Negative for fever, chills, and weight loss, Eyes: Negative for injury, mh7 

      pain, redness, and discharge, ENT: Negative for injury, pain, and discharge, Neck:          

      Negative for injury, pain, and swelling, Cardiovascular: Negative for chest pain,           

      palpitations, and edema, Respiratory: Negative for shortness of breath, cough,              

      wheezing, and pleuritic chest pain, Abdomen/GI: Negative for abdominal pain, nausea,        

      vomiting, diarrhea, and constipation, Back: Negative for injury and pain, : Negative      

      for injury, bleeding, discharge, and swelling, MS/Extremity: Negative for injury and        

      deformity, Skin: Negative for injury, rash, and discoloration, Neuro: Negative for          

      headache, weakness, numbness, tingling, and seizure, Allergy/Immunology: Negative for       

      hives, rash, and allergies, Endocrine: Negative for neck swelling, polydipsia,              

      polyuria, polyphagia, and marked weight changes, Hematologic/Lymphatic: Negative for        

      swollen nodes, abnormal bleeding, and unusual bruising.                                     

                                                                                                  

Exam:                                                                                             

23:40 Head/Face:  Normocephalic, atraumatic. Eyes:  Pupils equal round and reactive to light, mh7 

      extra-ocular motions intact.  Lids and lashes normal.  Conjunctiva and sclera are           

      non-icteric and not injected.  Cornea within normal limits.  Periorbital areas with no      

      swelling, redness, or edema. Neck:  Trachea midline, no thyromegaly or masses palpated,     

      and no cervical lymphadenopathy.  Supple, full range of motion without nuchal rigidity,     

      or vertebral point tenderness.  No Meningismus. Chest/axilla:  Normal chest wall            

      appearance and motion.  Nontender with no deformity.  No lesions are appreciated.           

      Cardiovascular:  Regular rate and rhythm with a normal S1 and S2.  No gallops, murmurs,     

      or rubs.  Normal PMI, no JVD.  No pulse deficits. Respiratory:  Lungs have equal breath     

      sounds bilaterally, clear to auscultation and percussion.  No rales, rhonchi or wheezes     

      noted.  No increased work of breathing, no retractions or nasal flaring. Abdomen/GI:        

      Soft, non-tender, with normal bowel sounds.  No distension or tympany.  No guarding or      

      rebound.  No evidence of tenderness throughout. Back:  No spinal tenderness.  No            

      costovertebral tenderness.  Full range of motion. Skin:  Warm, dry with normal turgor.      

      Normal color with no rashes, no lesions, and no evidence of cellulitis. MS/ Extremity:      

      Pulses equal, no cyanosis.  Neurovascular intact.  Full, normal range of motion. Neuro:     

       Awake and alert, GCS 15, oriented to person, place, time, and situation.  Cranial          

      nerves II-XII grossly intact.  Motor strength 5/5 in all extremities.  Sensory grossly      

      intact.  Cerebellar exam normal.  Normal gait.                                              

23:40 Constitutional: The patient appears in no acute distress, alert, awake, anxious.            

03/10                                                                                             

03:24 Psych: Behavior/mood is cooperative, Affect is calm, Oriented to person, place, time,   [facility] 

      Patient having thoughts of homicide. Denies plan. Judgement / Insight is normal. Memory     

      is normal. Delusions/hallucinations are present and described as Hearing voices telling     

      him to harm other people.                                                                   

                                                                                                  

Vital Signs:                                                                                      

                                                                                             

22:32  / 97; Pulse 74; Resp 16 S; Temp 98(O); Pulse Ox 100% on R/A; Weight 92.99 kg     bb  

      (R); Height 5 ft. 11 in. (180.34 cm) (R); Pain 0/10;                                        

03/10                                                                                             

02:00  / 85; Pulse 72; Resp 18; Temp 98; Pulse Ox 99% ;                                 rr5 

                                                                                             

22:32 Body Mass Index 28.59 (92.99 kg, 180.34 cm)                                             bb  

                                                                                                  

MDM:                                                                                              

03:25 Differential diagnosis: acute psychotic break, psychosis secondary to non-compliance.   [facility] 

      Data reviewed: vital signs, nurses notes, lab test result(s), CBC, electrolytes,            

      urinalysis, urine drug screen, EKG. Data interpreted: Pulse oximetry: on room air is 99     

      %. Interpretation: normal. Counseling: I had a detailed discussion with the patient         

      and/or guardian regarding: the historical points, exam findings, and any diagnostic         

      results supporting the discharge/admit diagnosis, lab results, the need to transfer to      

      another facility, for higher level of care, [facility] does not           

      immediately have the required specialist. Response to treatment: the patient's symptoms     

      have mildly improved after treatment.                                                       

03:28 Patient medically screened.                                                             [facility] 

                                                                                                  

                                                                                             

23:24 Order name: Acetaminophen                                                               [facility] 

                                                                                             

23:24 Order name: Basic Metabolic Panel; Complete Time: 00:56                                 [facility] 

                                                                                             

23:24 Order name: CBC with Diff; Complete Time: 00:56                                         [facility] 

                                                                                             

23:24 Order name: ETOH Level; Complete Time: 00:56                                            [facility] 

                                                                                             

23:24 Order name: Hepatic Function; Complete Time: 00:56                                      [facility] 

                                                                                             

23:24 Order name: PT-INR; Complete Time: 00:56                                                [facility] 

                                                                                             

23:24 Order name: Ptt, Activated; Complete Time: 00:56                                        [facility] 

                                                                                             

23:24 Order name: Salicylate; Complete Time: 00:56                                            [facility] 

                                                                                             

23:24 Order name: Urine Drug Screen; Complete Time: 00:56                                     [facility] 

                                                                                             

23:24 Order name: Acetaminophen Level; Complete Time: 00:56                                   [facility]

                                                                                             

23:47 Order name: Urine Dipstick--Ancillary (enter results); Complete Time: 23:54             [facility] 

03/10                                                                                             

00:17 Order name: COVID-19 : Document "Date of Symptom Onset" if Symptomatic.                 [facility] 

03/10                                                                                             

01:31 Order name: SARS-COV-2 RT PCR; Complete Time: 03:11                                     [facility]

                                                                                             

23:24 Order name: EKG; Complete Time: 23:25                                                   [facility] 

                                                                                             

23:24 Order name: EKG - Nurse/Tech; Complete Time: 00:00                                      [facility] 

                                                                                             

23:24 Order name: IV Saline Lock; Complete Time: 00:00                                        [facility] 

                                                                                             

23:24 Order name: Labs collected and sent; Complete Time: 00:00                               [facility] 

                                                                                             

23:24 Order name: Urine Dipstick-Ancillary (obtain specimen); Complete Time: 00:00            [facility] 

                                                                                                  

Administered Medications:                                                                         

03:17 Drug: Potassium Effervescent Tablet 50 mEq Route: PO;                                     

04:00 Follow up: Response: No adverse reaction                                                rr5 

                                                                                                  

                                                                                                  

Disposition:                                                                                      

03/10/21 03:28 Transfer ordered to [facility]. Diagnosis is Psychosis.                        

- Reason for transfer: Higher level of care.                                                      

- Accepting physician is Dr. Figueroa.                                                                 

- Condition is Stable.                                                                            

- Problem is an acute exacerbation.                                                               

- Symptoms are unchanged.                                                                         

                                                                                                  

                                                                                                  

                                                                                                  

Signatures:                                                                                       

Dispatcher [employer]                           [facility]                                                 

Sheyla Coleman RN RN bb Habalo, Winsy, RN RN                                                      

Reynaldo Tafoya RN RN   rr5                                                  

Adin Ang MD MD   7                                                  

                                                                                                  

Corrections: (The following items were deleted from the chart)                                    

00:36 00:18 CORONAVIRUS ordered. [facility]

01:25  22:37 Home Meds: aripiprazole oral oral; bb                                       rr5 

03/10                                                                                             

01:25  22:37 Home Meds: sertraline oral oral; bb                                         rr5 

03/10                                                                                             

04:28 03:28 03/10/2021 03:28 Transfer ordered to Psych Facility. Diagnosis is Psychosis.      rr5 

      Reason for transfer: Higher level of care. Accepting physician is Dr. Figueroa. Condition is     

      Stable. Problem is an acute exacerbation. Symptoms are unchanged. mh7                       

                                                                                                  

**************************************************************************************************

## 2021-03-11 NOTE — EKG
Test Date:    2021-03-09               Test Time:    23:56:17

Technician:   ROBIN                                   

                                                     

MEASUREMENT RESULTS:                                       

Intervals:                                           

Rate:         78                                     

VA:           158                                    

QRSD:         112                                    

QT:           384                                    

QTc:          437                                    

Axis:                                                

P:            57                                     

VA:           158                                    

QRS:          47                                     

T:            -2                                     

                                                     

INTERPRETIVE STATEMENTS:                                       

                                                     

Normal sinus rhythm

Moderate voltage criteria for LVH, may be normal variant

Nonspecific T wave abnormality

Abnormal ECG

No previous ECG available for comparison



Electronically Signed On 03-11-21 05:11:41 CST by Kenji Martínez

## 2025-01-23 ENCOUNTER — HOSPITAL ENCOUNTER (EMERGENCY)
Dept: HOSPITAL 97 - ER | Age: 40
Discharge: PSYCHIATRIC OR SUBSTANCE USE FACILITY | End: 2025-01-23
Payer: COMMERCIAL

## 2025-01-23 VITALS — DIASTOLIC BLOOD PRESSURE: 85 MMHG | OXYGEN SATURATION: 99 % | TEMPERATURE: 98.3 F | SYSTOLIC BLOOD PRESSURE: 128 MMHG

## 2025-01-23 DIAGNOSIS — R45.851: Primary | ICD-10-CM

## 2025-01-23 LAB
ALBUMIN SERPL BCP-MCNC: 3.8 G/DL (ref 3.4–5)
ALBUMIN/GLOB SERPL: 1.1 {RATIO} (ref 1.1–1.8)
ALP SERPL-CCNC: 65 U/L (ref 45–117)
ALT SERPL W P-5'-P-CCNC: 111 U/L (ref 16–61)
ANION GAP SERPL CALC-SCNC: 9.3 MEQ/L (ref 5–15)
APTT BLD: 28.8 SECONDS (ref 24.3–36.9)
AST SERPL W P-5'-P-CCNC: 54 U/L (ref 15–37)
BILIRUB INDIRECT SERPL-MCNC: 0.7 MG/DL (ref 0.2–0.8)
BUN BLD-MCNC: 15 MG/DL (ref 7–18)
GLOBULIN SER CALC-MCNC: 3.6 G/DL (ref 2.3–3.5)
GLUCOSE SERPLBLD-MCNC: 213 MG/DL (ref 74–106)
HCT VFR BLD CALC: 43.6 % (ref 39.6–49)
HGB BLD-MCNC: 15.7 G/DL (ref 13.6–17.9)
INR BLD: 1.02
LYMPHOCYTES # SPEC AUTO: 2 K/UL (ref 0.7–4.9)
MCH RBC QN AUTO: 32.8 PG (ref 27–35)
MCHC RBC AUTO-ENTMCNC: 36 G/DL (ref 32–36)
MCV RBC: 91 FL (ref 80–100)
METHAMPHET UR QL SCN: NEGATIVE
NRBC # BLD: 0 10*3/UL (ref 0–0)
NRBC BLD AUTO-RTO: 0 % (ref 0–0)
PMV BLD: 9.3 FL (ref 7.6–11.3)
POTASSIUM SERPL-SCNC: 3.3 MEQ/L (ref 3.5–5.1)
PROTHROMBIN TIME: 10.7 SECONDS (ref 9.4–12.5)
RBC # BLD: 4.79 M/UL (ref 4.33–5.43)
SQUAMOUS URNS QL MICRO: <5 /HPF
THC SERPL-MCNC: POSITIVE NG/ML
UA COMPLETE W REFLEX CULTURE PNL UR: (no result)
UA DIPSTICK W REFLEX MICRO PNL UR: (no result)
WBC # BLD AUTO: 5.9 THOU/UL (ref 4.3–10.9)

## 2025-01-23 PROCEDURE — 80143 DRUG ASSAY ACETAMINOPHEN: CPT

## 2025-01-23 PROCEDURE — 80179 DRUG ASSAY SALICYLATE: CPT

## 2025-01-23 PROCEDURE — 81001 URINALYSIS AUTO W/SCOPE: CPT

## 2025-01-23 PROCEDURE — 85610 PROTHROMBIN TIME: CPT

## 2025-01-23 PROCEDURE — 82077 ASSAY SPEC XCP UR&BREATH IA: CPT

## 2025-01-23 PROCEDURE — 99285 EMERGENCY DEPT VISIT HI MDM: CPT

## 2025-01-23 PROCEDURE — 85025 COMPLETE CBC W/AUTO DIFF WBC: CPT

## 2025-01-23 PROCEDURE — 85730 THROMBOPLASTIN TIME PARTIAL: CPT

## 2025-01-23 PROCEDURE — 80076 HEPATIC FUNCTION PANEL: CPT

## 2025-01-23 PROCEDURE — 36415 COLL VENOUS BLD VENIPUNCTURE: CPT

## 2025-01-23 PROCEDURE — 82947 ASSAY GLUCOSE BLOOD QUANT: CPT

## 2025-01-23 PROCEDURE — 80048 BASIC METABOLIC PNL TOTAL CA: CPT

## 2025-01-23 PROCEDURE — 80307 DRUG TEST PRSMV CHEM ANLYZR: CPT

## 2025-01-23 NOTE — EDPHYS
Physician Documentation                                                                           

 [facility]                                                                 

Name: Nhan Santos                                                                             

Age: 40 yrs                                                                                       

Sex: Male                                                                                         

: 1985                                                                                   

MRN: [id number]                                                                                   

Arrival Date: 2025                                                                          

Time: 10:27                                                                                       

Account#: [id number]                                                                            

Bed 13                                                                                            

Private MD:                                                                                       

ED Physician Anthony Tiwari                                                                     

HPI:                                                                                              

                                                                                             

11:44 This 40 yrs old Male presents to ER via Ambulatory with complaints of Suicidal Ideation.rt  

11:44 Patient presents to the ED with suicidal ideation. Patient states that he has not been  rt  

      taking his psych meds for about 1 week, has had worsening symptoms. He has a plan to        

      shoot himself with a gun, has access to firearms. Denies physical complaints, symptoms      

      are moderate severity, no other aggravating alleviating factors..                           

                                                                                                  

Historical:                                                                                       

- Allergies:                                                                                      

10:42 No Known Allergies;                                                                     ap3 

- PMHx:                                                                                           

10:42 Diabetes - NIDDM; Pancreatitis; Schizo-affective disorder;                              ap3 

                                                                                                  

- Immunization history:: Client reports receiving the 2nd dose of the Covid vaccine,              

  Flu vaccine is not up to date.                                                                  

- Infectious Disease History:: Denies.                                                            

- Social history:: Smoking status: Patient denies any tobacco usage or history of.                

  Patient uses alcohol, occasionally.                                                             

- Family history:: not pertinent.                                                                 

                                                                                                  

                                                                                                  

ROS:                                                                                              

11:44 Constitutional: Negative for fever, chills, and weight loss, Cardiovascular: Negative   rt  

      for chest pain, palpitations, and edema, Respiratory: Negative for shortness of breath,     

      cough, wheezing, and pleuritic chest pain, Abdomen/GI: Negative for abdominal pain,         

      nausea, vomiting, diarrhea, and constipation, MS/Extremity: Negative for injury and         

      deformity, Skin: Negative for injury, rash, and discoloration,                              

11:44 Psych: Positive for anxiety, suicidal ideation,                                             

                                                                                                  

Exam:                                                                                             

11:44 Constitutional:  This is a well developed, well nourished patient who is awake, alert,  rt  

      and in no acute distress. Head/Face:  Normocephalic, atraumatic. Chest/axilla:  Normal      

      chest wall appearance and motion.  Nontender with no deformity.  No lesions are             

      appreciated. Cardiovascular:  Regular rate and rhythm with a normal S1 and S2.  No          

      gallops, murmurs, or rubs.  Normal PMI, no JVD.  No pulse deficits. Respiratory:  Lungs     

      have equal breath sounds bilaterally, clear to auscultation and percussion.  No rales,      

      rhonchi or wheezes noted.  No increased work of breathing, no retractions or nasal          

      flaring. Abdomen/GI:  Soft, non-tender, with normal bowel sounds.  No distension or         

      tympany.  No guarding or rebound.  No evidence of tenderness throughout. Skin:  Warm,       

      dry with normal turgor.  Normal color with no rashes, no lesions, and no evidence of        

      cellulitis. MS/ Extremity:  Pulses equal, no cyanosis.  Neurovascular intact.  Full,        

      normal range of motion.                                                                     

11:44 ECG was reviewed by the Attending Physician.                                                

                                                                                                  

Vital Signs:                                                                                      

10:40  / 109; Pulse 93; Resp 18; Temp 98; Pulse Ox 100% ; Weight 88.45 kg; Height 6 ft. ap3 

      0 in. ; Pain 5/10;                                                                          

13:35  / 85; Pulse 88; Resp 18; Temp 98.3; Pulse Ox 99% on R/A;                         db  

10:40 Body Mass Index 26.45 (88.45 kg, 182.88 cm)                                             ap3 

10:40 Pain Scale: Adult                                                                       ap3 

                                                                                                  

MDM:                                                                                              

10:52 Medical Screening Exam initiated                                                        rt  

17:04 Differential diagnosis: Suicidal ideation. Data reviewed: vital signs, nurses notes,    rt  

      lab test result(s), EKG. Consideration of Admission/Observation Patient requires            

      transfer to psychiatric hospital. I considered the following discharge prescriptions or     

      medication management in the emergency department Medications were administered in the      

      Emergency Department. See MAR. Counseling: I had a detailed discussion with the patient     

      and/or guardian regarding the historical points, exam findings, and any diagnostic          

      results supporting the discharge/admit diagnosis, lab results, the need to transfer to      

      another facility. Response to treatment: There is no appreciated change of the              

      patient's symptoms at this time.                                                            

                                                                                                  

                                                                                             

11: Order name: Acetaminophen; Complete Time: 12:37                                         rt  

                                                                                             

11:01 Order name: Basic Metabolic Panel; Complete Time: 12:37                                 rt  

                                                                                             

11: Order name: CBC with Diff; Complete Time: 12:37                                         rt  

                                                                                             

11: Order name: ETOH Level; Complete Time: 12:37                                            rt  

                                                                                             

11:01 Order name: Hepatic Function; Complete Time: 12:37                                      rt  

                                                                                             

11:01 Order name: PT-INR; Complete Time: 12:37                                                rt  

                                                                                             

11:01 Order name: Ptt, Activated; Complete Time: 12:37                                        rt  

                                                                                             

11:01 Order name: Salicylate; Complete Time: 12:37                                            rt  

                                                                                             

11:01 Order name: Urinalysis w/ reflexes; Complete Time: 12:37                                rt  

                                                                                             

11:01 Order name: Urine Drug Screen                                                           rt  

                                                                                             

12:59 Order name: Glucose, Ancillary Testing                                                  EDMS

                                                                                             

11:01 Order name: EKG - Nurse/Tech; Complete Time: 12:05                                      rt  

                                                                                             

11:01 Order name: IV Saline Lock; Complete Time: 11:25                                        rt  

                                                                                             

11: Order name: Labs collected and sent; Complete Time: 11:                               rt  

                                                                                             

11: Order name: Suicide Precautions; Complete Time: :                                   rt  

                                                                                             

11: Order name: Suicide Screening ([place]); Complete Time: 11:25                          rt  

                                                                                                  

EC:44 Rate is 90 beats/min. Rhythm is regular, Normal Sinus Rhythm with No ectopy. Left axis  rt  

      deviation noted. ND interval is normal. QRS interval is normal. QT interval is normal.      

      No Q waves.                                                                                 

                                                                                                  

Administered Medications:                                                                         

11:28 Drug: LORazepam PO 2 mg PO once Route: PO;                                              db  

13:59 Follow up: Response: No adverse reaction; Anxiety decreased                             db  

                                                                                                  

                                                                                                  

Disposition Summary:                                                                              

25 12:38                                                                                    

Transfer Ordered                                                                                  

 Notes:       Transfer Location: Psych Facility                                                     
  rt

      Reason: Higher level of care                                                            rt  

      Condition: Stable                                                                       rt  

      Problem: new                                                                            rt  

      Symptoms: are unchanged                                                                 rt  

      Accepting Physician: (25 14:54)                                                em1 

      Diagnosis                                                                                   

        - Suicidal ideations                                                                  rt  

      Forms:                                                                                      

        - Medication Reconciliation Form                                                      rt  

        - SBAR form                                                                           rt  

Signatures:                                                                                       

Dispatcher [employer]                           EDMS                                                 

William Crocker                               em1                                                  

Breanne Cobb RN                    RN   ap3                                                  

Thelma Blanco RN                    RN   db                                                   

Anthony Tiwari MD MD   rt                                                   

                                                                                                  

Corrections: (The following items were deleted from the chart)                                    

11:02 11:02 ACETAMINOPHEN+C.LAB.BRZ ordered. EDMS                                             EDMS

11:02 11:02 BASIC METABOLIC PANEL+C.LAB.BRZ ordered. EDMS                                     EDMS

11: 11:02 CBC+H.LAB.BRZ ordered. EDMS                                                       EDMS

11:02 11:02 ETHANOL+C.LAB.BRZ ordered. EDMS                                                   EDMS

11: 11:02 HEPATIC FUNCTION+C.LAB.BRZ ordered. EDMS                                          EDMS

11: 11:02 PROTIME (+INR)+COAG.LAB.BRZ ordered. EDMS                                         EDMS

11: 11:02 PTT, ACTIVATED+COAG.LAB.BRZ ordered. EDMS                                         EDMS

11: 11:02 SALICYLATE+C.LAB.BRZ ordered. EDMS                                                EDMS

11: 11:02 Urinalysis+U.LAB.BRZ ordered. EDMS                                                EDMS

11: 11:02 URINE DRUG SCREEN+UC.LAB.BRZ ordered. EDMS                                        EDMS

14:54 12:38  rt                                                                            em1 

                                                                                                  

**************************************************************************************************

## 2025-01-23 NOTE — ER
Nurse's Notes                                                                                     

 [facility]                                                                 

Name: Nhan Santos                                                                             

Age: 40 yrs                                                                                       

Sex: Male                                                                                         

: 1985                                                                                   

MRN: [id number]                                                                                   

Arrival Date: 2025                                                                          

Time: 10:27                                                                                       

Account#: [id number]                                                                            

Bed 13                                                                                            

Private MD:                                                                                       

Diagnosis: Suicidal ideations                                                                     

                                                                                                  

Presentation:                                                                                     

                                                                                             

10:40 Chief complaint: Patient states: he was visiting his doctor, he is off his meds.        ap3 

      Patient is feeling suicidal, and has a plan to "shoot myself in the head". patient          

      reports having access to weapons. patient states "I just dont want to deal with it          

      anymore, I'm tired and I don't sleep.". Coronavirus screen: At this time, the client        

      does not indicate any symptoms associated with coronavirus-19. Ebola Screen: No             

      symptoms or risks identified at this time. Initial Sepsis Screen: Does the patient meet     

      any 2 criteria? No. Patient's initial sepsis screen is negative. Does the patient have      

      a suspected source of infection? No. Patient's initial sepsis screen is negative. Risk      

      Assessment: Do you want to hurt yourself or someone else? Patient reports                   

      desire/thoughts of hurting themselves or someone else. Provider notified. Onset of          

      symptoms is unknown.                                                                        

10:40 Method Of Arrival: Ambulatory                                                           ap3 

10:40 Acuity: FLAVIO 2                                                                           ap3 

                                                                                                  

Triage Assessment:                                                                                

10:42 General: Appears distressed, Behavior is calm, cooperative, appropriate for age. Pain:  ap3 

      Complains of pain in back Pain began years ago. Neuro: Level of Consciousness is awake,     

      alert, obeys commands, Oriented to person, place, time, situation, Appropriate for age.     

      Cardiovascular: Patient's skin is warm and dry. Respiratory: Airway is patent               

      Respiratory effort is even, unlabored, Respiratory pattern is regular, symmetrical.         

                                                                                                  

Historical:                                                                                       

- Allergies:                                                                                      

10:42 No Known Allergies;                                                                     ap3 

- PMHx:                                                                                           

10:42 Diabetes - NIDDM; Pancreatitis; Schizo-affective disorder;                              ap3 

                                                                                                  

- Immunization history:: Client reports receiving the 2nd dose of the Covid vaccine,              

  Flu vaccine is not up to date.                                                                  

- Infectious Disease History:: Denies.                                                            

- Social history:: Smoking status: Patient denies any tobacco usage or history of.                

  Patient uses alcohol, occasionally.                                                             

- Family history:: not pertinent.                                                                 

                                                                                                  

                                                                                                  

Screening:                                                                                        

10:43 Nutritional screening: No deficits noted. Tuberculosis screening: No symptoms or risk   ap3 

      factors identified.                                                                         

10:46 [facility] ED Fall Risk Assessment (Adult) History of falling in the last 3 months,       ap3 

      including since admission Yes- fall prone (multiple falls) (3 pts) Confusion or             

      Disorientation No (0 pts) Intoxicated or Sedated No (0 pts) Impaired Gait No (0 pts)        

      Mobility Assist Device Used No (0 pt) Altered Elimination No (0 pt) Score/Fall Risk         

      Level 3 or more points = High Risk Oriented to surroundings, Maintained a safe              

      environment, Educated pt \T\ family on fall prevention, incl call for assistance when       

      getting out of bed, Assessed \T\ reinforced patient's understanding of fall precautions,    

      Provided non-skid footwear, Hourly rounding (assess needs \T\ fall precautionary            

      measures) done, Used ambulatory aids as needed (educated on \T\ assisted with), Used gait   

      belt as appropriate Implemented a Fall Risk Plan of Care, Remained w/in arm's length of     

      patient and in sight while toileting, Offered frequent toileting (1:1 observation),         

      Remained with patient while ambulating, Utilized family, sitter, or virtual        

      as indicated.                                                                               

11:00 Abuse screen: Denies threats or abuse. Denies injuries from another.                    kb3 

                                                                                                  

Assessment:                                                                                       

11:00 General: Appears comfortable, Behavior is anxious.                                      kb3 

11:00 Pain: Denies pain. Neuro: No deficits noted. Denies weakness headache. Cardiovascular:  kb3 

      No deficits noted. Denies chest pain, lightheadedness, nausea, palpitations, shortness      

      of breath. Respiratory: No deficits noted. Denies cough, shortness of breath labored        

      breathing. GI: No deficits noted. Patient currently denies abdominal pain, diarrhea,        

      nausea, pain, vomiting. : No deficits noted. Denies burning with urination, inability     

      to void, pain.                                                                              

12:00 Reassessment: Patient appears in no apparent distress at this time. Patient and/or      db  

      family updated on plan of care and expected duration. Pain level reassessed. Patient is     

      alert, oriented x 3, equal unlabored respirations, skin warm/dry/pink.                      

13:00 Reassessment: Patient appears in no apparent distress at this time. Patient and/or      db  

      family updated on plan of care and expected duration. Pain level reassessed. Patient is     

      alert, oriented x 3, equal unlabored respirations, skin warm/dry/pink.                      

13:32 Reassessment: REPORT GIVEN TO APRIL FROM [facility].                               db  

13:36 Reassessment: STATES WILL GIVE ACCEPTANCE.                                              db  

14:29 Reassessment: REPORT TO EMS FOR TRANSPORT.                                              bp  

                                                                                                  

Psych:                                                                                            

10:43 [place] Suicide Severity Screening: In the past month, have you wished you were dead   ap3 

      or wished you could go to sleep and not wake up? Patient responds "yes." "In the past       

      month, have you actually had any thoughts of killing yourself?" Patient responds "yes."     

      "In your lifetime, have you ever done anything, started to do anything, or prepared to      

      do anything to end your life?" Patient responds "yes.". Subjective: Patient's mood is       

      sad, Hallucinations are auditory, visual, "i see shadow people, I hear voices" Having       

      thoughts of suicide. Plan for suicide is "shoot myself in the head". Objective: Patient     

      is cooperative, Speech is normal, Affect is appropriate. Patient uses Last use was 4        

      days ago.                                                                                   

11:05 Interventions: Removed personal items and placed in bag. Patient placed in hospital     db  

      gown. Searched person for dangerous items. Belonging list filled out. Safety Checks:        

      Personal items have been removed. Commitment: Patient will be a voluntary commitment.       

                                                                                                  

Vital Signs:                                                                                      

10:40  / 109; Pulse 93; Resp 18; Temp 98; Pulse Ox 100% ; Weight 88.45 kg; Height 6 ft. ap3 

      0 in. ; Pain 5/10;                                                                          

13:35  / 85; Pulse 88; Resp 18; Temp 98.3; Pulse Ox 99% on R/A;                         db  

10:40 Body Mass Index 26.45 (88.45 kg, 182.88 cm)                                             ap3 

10:40 Pain Scale: Adult                                                                       ap3 

                                                                                                  

ED Course:                                                                                        

10:31 Patient arrived in ED.                                                                  ra3 

10:41 Triage completed.                                                                       ap3 

10:43 Anthony Tiwari MD is Attending Physician.                                            rt  

10:45 Arm band placed on right wrist.                                                         ap3 

11:00 Patient has correct armband on for positive identification. Fall risk band placed. Bed  kb3 

      in low position. Valuables inventory done. Locked in safe. See valuables checklist.         

      Provided Education on: Plan of care including medical clearance and initiation of           

      transfer to inpatient psychiatric facility. Warm blanket given. Verbal reassurance          

      given. Diet: Patient given a regular meal tray. Safety tray ordered.                        

11:00 No provider procedures requiring assistance completed. Inserted saline lock: 20 gauge   kb3 

      in right antecubital area, using aseptic technique.                                         

11:23 Thelma Blanco, RN is Primary Nurse.                                                  db  

13:21 Transfer initiated via fax to [facility].                                           em1 

13:36 Transfer to [facility] accepted.                                                    em1 

14:30 IV discontinued, intact, bleeding controlled, No redness/swelling at site. Pressure     bp  

      dressing applied.                                                                           

                                                                                                  

Administered Medications:                                                                         

11:28 Drug: LORazepam PO 2 mg PO once Route: PO;                                              db  

13:59 Follow up: Response: No adverse reaction; Anxiety decreased                             db  

                                                                                                  

                                                                                                  

Medication:                                                                                       

11:00 VIS not applicable for this client.                                                     kb3 

                                                                                                  

Outcome:                                                                                          

12:38 ER care complete, transfer ordered by MD.                                               rt  

14:30 Transferred by ground EMS                                                               bp  

14:30 Condition: stable                                                                           

14:30 Instructed on the need for transfer,                                                        

14:54 Patient left the ED.                                                                    em1 

                                                                                                  

Signatures:                                                                                       

William Crocker                               em1                                                  

Colby Shea RN                      RN   bp                                                   

Breanne Cobb RN                    RN   ap3                                                  

Zulay Couch RN                    RN   kb3                                                  

Thelma Blanco, RN                    RN   db                                                   

Anthony Tiwari MD MD   rt                                                   

Zehra Mercer                                   ra3                                                  

                                                                                                  

Corrections: (The following items were deleted from the chart)                                    

11:18 11:18 VIS not applicable for this client. kb3                                           kb3 

                                                                                                  

**************************************************************************************************